# Patient Record
Sex: FEMALE | Race: WHITE | NOT HISPANIC OR LATINO | Employment: OTHER | URBAN - METROPOLITAN AREA
[De-identification: names, ages, dates, MRNs, and addresses within clinical notes are randomized per-mention and may not be internally consistent; named-entity substitution may affect disease eponyms.]

---

## 2016-10-04 LAB
EXTERNAL ABO GROUPING: NORMAL
EXTERNAL RH FACTOR: POSITIVE

## 2016-10-14 LAB
EXTERNAL ANTIBODY SCREEN: NEGATIVE
EXTERNAL CHLAMYDIA SCREEN: NEGATIVE
EXTERNAL GONORRHEA SCREEN: NEGATIVE
EXTERNAL GTT 1 HOUR: 127
EXTERNAL GTT 3 HOURS: 73
EXTERNAL HEPATITIS B SURFACE ANTIGEN: NEGATIVE
EXTERNAL RUBELLA QUALITATIVE: NORMAL
EXTERNAL SYPHILIS RPR SCREEN: NORMAL
EXTERNAL URINE DRUG SCREEN: NEGATIVE
HIV1 AB SPEC QL IA.RAPID: NEGATIVE

## 2016-11-01 LAB — EXTERNAL GENETIC TESTING, MATERNAL BLOOD: NORMAL

## 2017-03-28 ENCOUNTER — HOSPITAL ENCOUNTER (OUTPATIENT)
Facility: HOSPITAL | Age: 27
Discharge: HOME | End: 2017-03-28
Attending: OBSTETRICS & GYNECOLOGY | Admitting: OBSTETRICS & GYNECOLOGY

## 2017-03-28 VITALS
BODY MASS INDEX: 29.98 KG/M2 | HEART RATE: 93 BPM | WEIGHT: 191 LBS | TEMPERATURE: 98.3 F | HEIGHT: 67 IN | RESPIRATION RATE: 18 BRPM | SYSTOLIC BLOOD PRESSURE: 133 MMHG | DIASTOLIC BLOOD PRESSURE: 74 MMHG

## 2017-03-28 PROCEDURE — G0463 HOSPITAL OUTPT CLINIC VISIT: HCPCS

## 2017-03-28 PROCEDURE — 59025 FETAL NON-STRESS TEST: CPT

## 2017-03-28 RX ORDER — DOXYLAMINE SUCCINATE AND PYRIDOXINE HYDROCHLORIDE, DELAYED RELEASE TABLETS 10 MG/10 MG 10; 10 MG/1; MG/1
1 TABLET, DELAYED RELEASE ORAL 4 TIMES DAILY
COMMUNITY
End: 2017-05-11 | Stop reason: HOSPADM

## 2017-03-28 RX ORDER — PRENATAL WITH FERROUS FUM AND FOLIC ACID 3080; 920; 120; 400; 22; 1.84; 3; 20; 10; 1; 12; 200; 27; 25; 2 [IU]/1; [IU]/1; MG/1; [IU]/1; MG/1; MG/1; MG/1; MG/1; MG/1; MG/1; UG/1; MG/1; MG/1; MG/1; MG/1
TABLET ORAL DAILY
COMMUNITY
End: 2022-08-16

## 2017-03-28 NOTE — NURSING NOTE
Pt given discharge instructions for home,  Pt instructed to return to l & d for srom, decreased fm, or vaginal bleeding , pt with vu .

## 2017-03-28 NOTE — H&P
[facility]  Obstetric History and Physical    Chief Complaint   Patient presents with   • Decreased Fetal Movement     pt reports decreased fm over the last since yesterday morning        Subjective     Patient is a 27 y.o. female  currently at 33w2d, who presents with decreased fetal movement.    Her prenatal care is benign.  Her previous obstetric/gynecological history is noted for is non-contributory.    The following portions of the patients history were reviewed and updated as appropriate: current medications, allergies, past medical history, past surgical history, past family history, past social history and problem list .       Prenatal Information:  Prenatal Results         1st Trimester Ref. Range Date Time   CBC with auto diff       Rubella IgG       Hepatitis B SAg       RPR       ABO ^ O   10/04/16    Rh ^ Positive   10/04/16    Anibody Screen       HIV       Varicella IgG       Urinalysis with microscopy       Urine Culture       GC/Chlamydia/TV       ThinPrep/Pap       2nd and 3rd Trimester Ref. Range Date Time   Hemoglobin / Hematocrit       Hemoglobin       Group B Strep Culture       Glucose Challenge Test 1 Hr       Glucose Fasting       Glucose 1 Hr       Glucose 2 Hr       Glucose 3 Hr       Pre-eclampsia Panel       Risk Screening Ref. Range Date Time   Fetal Fibronectin       Amnisure       Hepatitis C Antibody       Hemoglobin electrophoresis       Cystic Fibrosis       Hemoglobin A1C       MSAFP - 4 ^ harmony -  negative   16 0800   NIPT       AFP       Parvovirus IgG       Parvovirus IgM       POCT - glucose       Florian-Sac       24 Hour urine - Total protein       24 Hour urine - Creatinine clearance       Urinalysis with microscopy       Urine Culture       Drug Screening Ref. Range Date Time   Amphetamine Screen       Barbiturate Screen       Benzodiazepine Screen       Methadone Screen       Phencyclidine Screen       Opiates Screen       THC Screen       Cocaine Screen        Propoxyphene Screen       Buprenorphine Screen       Methamphetamine Screen       Oxycodone Screen       Tryicyclic Antidepressants Screen              Legend: ^: Historical            View all results for this pregnancy        External Prenatal Results         Pregnancy Outside Results - these were transcribed from office records.  See scanned records for details. Date Time   Hgb      Hct      ABO ^ O  10/04/16    Rh ^ Positive  10/04/16    Antibody Screen      Glucose Fasting GTT      Glucose Tolerance Test 1 hour      Glucose Tolerance Test 3 hour      Gonorrhea (discrete)      Chlamydia (discrete)      RPR      VDRL      Syphillis Antibody      Rubella      HBsAg      Herpes Simplex Virus PCR      Herpes Simplex VIrus Culture      HIV      Hep C RNA Quant PCR      Hep C Antibody      Urine Drug Screen      AFP      Group B Strep      GBS Susceptibility to Clindamycin      GBS Susceptibility to Eythromycin      Fetal Fibronectin      Genetic Testing, Maternal Blood ^ harmony -  negative  16 0800          Legend: ^: Historical           Past OB History:     Obstetric History       T0      TAB0   SAB0   E0   M0   L0       # Outcome Date GA Lbr Morales/2nd Weight Sex Delivery Anes PTL Lv   1 Current                   Past Medical History: History reviewed. No pertinent past medical history.   Past Surgical History Past Surgical History:   Procedure Laterality Date   • RHINOPLASTY     • TONSILLECTOMY        Family History: History reviewed. No pertinent family history.   Social History:  reports that she has never smoked. She has never used smokeless tobacco.   reports that she does not drink alcohol.   reports that she does not use illicit drugs.        Review of Systems      Objective     Vital Signs Range for the last 24 hours  Temperature: Temp:  [98.3 °F (36.8 °C)] 98.3 °F (36.8 °C)   Temp Source: Temp src: Oral   BP: BP: (133)/(74) 133/74   Pulse: Heart Rate:  [93] 93   Respirations: Resp:   [18] 18   SPO2:     O2 Amount (l/min):     O2 Devices     Weight: Weight:  [191 lb (86.6 kg)] 191 lb (86.6 kg)     Physical Examination: General appearance - alert, well appearing, and in no distress  Neck - supple, no significant adenopathy  Abdomen - soft, nontender, nondistended, no masses or organomegaly  Pelvic - normal external genitalia, vulva, vagina, cervix, uterus and adnexa  Musculoskeletal - no joint tenderness, deformity or swelling  Extremities - peripheral pulses normal, no pedal edema, no clubbing or cyanosis    Presentation:    Cervix: Exam by: Method: sterile exam per physician   Dilation: Dilation: 0   Effacement: 0   Station: -4, posterior     Fetal Heart Rate Assessment   Method: Fetal HR Assessment Method: external   Beats/min: Fetal HR (Beats/Min): 145   Baseline: Fetal HR Baseline: normal range (110-160 bpm)   Varibility: Fetal HR Variability: moderate (amplitude range 6 to 25 bpm)   Accels: Fetal HR Accelerations: greater than/equal to 15 bpm, lasting at least 15 seconds   Decels: Fetal HR Decelerations: absent   Tracing Category:       Uterine Assessment   Method: Method: TOCO (external toco transducer)   Frequency (min): Contraction Frequency (min): x2   Ctx Count in 10 min:     Duration: Contraction Duration (sec): 60   Intensity:     Intensity by IUPC:     Resting Tone:     Resting Tone by IUPC:     Nato Units:       Assessment/Plan       Assessment & Plan    Assessment:  1.  Intrauterine pregnancy at 33w2d weeks gestation with  Reassuring fetal status, very reactive NST/monitoring x 1hr.    2.  decreased fetal movement - BPP 8/8 in office today  3.  Obstetrical history significant for is non-contributory.  4.  GBS status: No results found for: GBSANTIGEN    Plan:  1. Discharge to home.  Has f/u in office in 1wk.  2. Plan of care has been reviewed with patient and she agrees.  3.  Risks, benefits of treatment plan have been discussed.  4.  All questions have been  answered.        Deisy Guzman MD  3/28/2017  6:39 PM

## 2017-04-14 ENCOUNTER — HOSPITAL ENCOUNTER (OUTPATIENT)
Facility: HOSPITAL | Age: 27
End: 2017-04-14
Attending: OBSTETRICS & GYNECOLOGY | Admitting: OBSTETRICS & GYNECOLOGY

## 2017-04-18 ENCOUNTER — LAB REQUISITION (OUTPATIENT)
Dept: LAB | Facility: HOSPITAL | Age: 27
End: 2017-04-18

## 2017-04-18 DIAGNOSIS — Z34.03 ENCOUNTER FOR SUPERVISION OF NORMAL FIRST PREGNANCY IN THIRD TRIMESTER: ICD-10-CM

## 2017-04-18 LAB — EXTERNAL GROUP B STREP ANTIGEN: NEGATIVE

## 2017-04-18 PROCEDURE — 87081 CULTURE SCREEN ONLY: CPT | Performed by: OBSTETRICS & GYNECOLOGY

## 2017-04-21 LAB — BACTERIA SPEC AEROBE CULT: NORMAL

## 2017-05-08 ENCOUNTER — HOSPITAL ENCOUNTER (INPATIENT)
Dept: LABOR AND DELIVERY | Facility: HOSPITAL | Age: 27
LOS: 2 days | Discharge: HOME | End: 2017-05-11
Attending: OBSTETRICS & GYNECOLOGY | Admitting: OBSTETRICS & GYNECOLOGY

## 2017-05-08 LAB
DEPRECATED RDW RBC AUTO: 51.1 FL (ref 37–54)
ERYTHROCYTE [DISTWIDTH] IN BLOOD BY AUTOMATED COUNT: 15 % (ref 11.3–14.5)
HCT VFR BLD AUTO: 35.5 % (ref 34.5–44)
HGB BLD-MCNC: 11.4 G/DL (ref 11.5–15.5)
MCH RBC QN AUTO: 30.2 PG (ref 27–31)
MCHC RBC AUTO-ENTMCNC: 32.1 G/DL (ref 32–36)
MCV RBC AUTO: 94.2 FL (ref 80–99)
PLATELET # BLD AUTO: 226 10*3/MM3 (ref 150–450)
PMV BLD AUTO: 10.9 FL (ref 6–12)
RBC # BLD AUTO: 3.77 10*6/MM3 (ref 3.89–5.14)
WBC NRBC COR # BLD: 10.44 10*3/MM3 (ref 3.5–10.8)

## 2017-05-08 PROCEDURE — 86900 BLOOD TYPING SEROLOGIC ABO: CPT | Performed by: OBSTETRICS & GYNECOLOGY

## 2017-05-08 PROCEDURE — 86901 BLOOD TYPING SEROLOGIC RH(D): CPT | Performed by: OBSTETRICS & GYNECOLOGY

## 2017-05-08 PROCEDURE — 86850 RBC ANTIBODY SCREEN: CPT | Performed by: OBSTETRICS & GYNECOLOGY

## 2017-05-08 PROCEDURE — 85027 COMPLETE CBC AUTOMATED: CPT | Performed by: OBSTETRICS & GYNECOLOGY

## 2017-05-08 RX ORDER — SODIUM CHLORIDE, SODIUM LACTATE, POTASSIUM CHLORIDE, CALCIUM CHLORIDE 600; 310; 30; 20 MG/100ML; MG/100ML; MG/100ML; MG/100ML
125 INJECTION, SOLUTION INTRAVENOUS CONTINUOUS
Status: DISCONTINUED | OUTPATIENT
Start: 2017-05-08 | End: 2017-05-09

## 2017-05-08 RX ADMIN — SODIUM CHLORIDE, POTASSIUM CHLORIDE, SODIUM LACTATE AND CALCIUM CHLORIDE 125 ML/HR: 600; 310; 30; 20 INJECTION, SOLUTION INTRAVENOUS at 23:25

## 2017-05-09 ENCOUNTER — ANESTHESIA EVENT (OUTPATIENT)
Dept: LABOR AND DELIVERY | Facility: HOSPITAL | Age: 27
End: 2017-05-09

## 2017-05-09 ENCOUNTER — ANESTHESIA (OUTPATIENT)
Dept: LABOR AND DELIVERY | Facility: HOSPITAL | Age: 27
End: 2017-05-09

## 2017-05-09 PROBLEM — Z34.90 PREGNANCY: Status: RESOLVED | Noted: 2017-05-09 | Resolved: 2017-05-09

## 2017-05-09 PROBLEM — Z34.90 PREGNANCY: Status: ACTIVE | Noted: 2017-05-09

## 2017-05-09 LAB
ABO GROUP BLD: NORMAL
BLD GP AB SCN SERPL QL: NEGATIVE
RH BLD: POSITIVE

## 2017-05-09 PROCEDURE — C1755 CATHETER, INTRASPINAL: HCPCS

## 2017-05-09 PROCEDURE — C1755 CATHETER, INTRASPINAL: HCPCS | Performed by: ANESTHESIOLOGY

## 2017-05-09 PROCEDURE — 25010000002 BUTORPHANOL PER 1 MG: Performed by: OBSTETRICS & GYNECOLOGY

## 2017-05-09 PROCEDURE — 25010000002 ONDANSETRON PER 1 MG: Performed by: OBSTETRICS & GYNECOLOGY

## 2017-05-09 PROCEDURE — 25010000002 ROPIVACAINE PER 1 MG: Performed by: NURSE ANESTHETIST, CERTIFIED REGISTERED

## 2017-05-09 PROCEDURE — 10907ZC DRAINAGE OF AMNIOTIC FLUID, THERAPEUTIC FROM PRODUCTS OF CONCEPTION, VIA NATURAL OR ARTIFICIAL OPENING: ICD-10-PCS | Performed by: OBSTETRICS & GYNECOLOGY

## 2017-05-09 PROCEDURE — 25010000002 FENTANYL CITRATE (PF) 100 MCG/2ML SOLUTION: Performed by: NURSE ANESTHETIST, CERTIFIED REGISTERED

## 2017-05-09 PROCEDURE — 59025 FETAL NON-STRESS TEST: CPT

## 2017-05-09 RX ORDER — BISACODYL 10 MG
10 SUPPOSITORY, RECTAL RECTAL DAILY PRN
Status: DISCONTINUED | OUTPATIENT
Start: 2017-05-10 | End: 2017-05-11 | Stop reason: HOSPADM

## 2017-05-09 RX ORDER — OXYTOCIN/RINGER'S LACTATE 30/500 ML
2-24 PLASTIC BAG, INJECTION (ML) INTRAVENOUS
Status: DISCONTINUED | OUTPATIENT
Start: 2017-05-09 | End: 2017-05-09 | Stop reason: HOSPADM

## 2017-05-09 RX ORDER — FENTANYL CITRATE 50 UG/ML
INJECTION, SOLUTION INTRAMUSCULAR; INTRAVENOUS AS NEEDED
Status: DISCONTINUED | OUTPATIENT
Start: 2017-05-09 | End: 2017-05-09 | Stop reason: SURG

## 2017-05-09 RX ORDER — ACETAMINOPHEN 325 MG/1
650 TABLET ORAL EVERY 4 HOURS PRN
Status: DISCONTINUED | OUTPATIENT
Start: 2017-05-09 | End: 2017-05-09 | Stop reason: HOSPADM

## 2017-05-09 RX ORDER — LIDOCAINE HYDROCHLORIDE 10 MG/ML
5 INJECTION, SOLUTION INFILTRATION; PERINEURAL AS NEEDED
Status: DISCONTINUED | OUTPATIENT
Start: 2017-05-09 | End: 2017-05-09 | Stop reason: HOSPADM

## 2017-05-09 RX ORDER — METOCLOPRAMIDE HYDROCHLORIDE 5 MG/ML
10 INJECTION INTRAMUSCULAR; INTRAVENOUS ONCE AS NEEDED
Status: DISCONTINUED | OUTPATIENT
Start: 2017-05-09 | End: 2017-05-09 | Stop reason: HOSPADM

## 2017-05-09 RX ORDER — ONDANSETRON 2 MG/ML
4 INJECTION INTRAMUSCULAR; INTRAVENOUS EVERY 6 HOURS PRN
Status: DISCONTINUED | OUTPATIENT
Start: 2017-05-09 | End: 2017-05-10

## 2017-05-09 RX ORDER — ONDANSETRON 2 MG/ML
4 INJECTION INTRAMUSCULAR; INTRAVENOUS ONCE AS NEEDED
Status: DISCONTINUED | OUTPATIENT
Start: 2017-05-09 | End: 2017-05-09 | Stop reason: HOSPADM

## 2017-05-09 RX ORDER — ZOLPIDEM TARTRATE 5 MG/1
5 TABLET ORAL NIGHTLY PRN
Status: DISCONTINUED | OUTPATIENT
Start: 2017-05-09 | End: 2017-05-11 | Stop reason: HOSPADM

## 2017-05-09 RX ORDER — DIPHENHYDRAMINE HYDROCHLORIDE 50 MG/ML
25 INJECTION INTRAMUSCULAR; INTRAVENOUS NIGHTLY PRN
Status: DISCONTINUED | OUTPATIENT
Start: 2017-05-09 | End: 2017-05-09 | Stop reason: HOSPADM

## 2017-05-09 RX ORDER — ZOLPIDEM TARTRATE 5 MG/1
5 TABLET ORAL NIGHTLY PRN
Status: DISCONTINUED | OUTPATIENT
Start: 2017-05-09 | End: 2017-05-09 | Stop reason: HOSPADM

## 2017-05-09 RX ORDER — PROMETHAZINE HYDROCHLORIDE 12.5 MG/1
12.5 SUPPOSITORY RECTAL EVERY 6 HOURS PRN
Status: DISCONTINUED | OUTPATIENT
Start: 2017-05-09 | End: 2017-05-09 | Stop reason: HOSPADM

## 2017-05-09 RX ORDER — PROMETHAZINE HYDROCHLORIDE 25 MG/1
25 TABLET ORAL EVERY 6 HOURS PRN
Status: DISCONTINUED | OUTPATIENT
Start: 2017-05-09 | End: 2017-05-11 | Stop reason: HOSPADM

## 2017-05-09 RX ORDER — DIPHENHYDRAMINE HCL 25 MG
25 CAPSULE ORAL NIGHTLY PRN
Status: DISCONTINUED | OUTPATIENT
Start: 2017-05-09 | End: 2017-05-09 | Stop reason: HOSPADM

## 2017-05-09 RX ORDER — PROMETHAZINE HYDROCHLORIDE 25 MG/ML
12.5 INJECTION, SOLUTION INTRAMUSCULAR; INTRAVENOUS EVERY 6 HOURS PRN
Status: DISCONTINUED | OUTPATIENT
Start: 2017-05-09 | End: 2017-05-09 | Stop reason: HOSPADM

## 2017-05-09 RX ORDER — SODIUM CHLORIDE, SODIUM LACTATE, POTASSIUM CHLORIDE, CALCIUM CHLORIDE 600; 310; 30; 20 MG/100ML; MG/100ML; MG/100ML; MG/100ML
125 INJECTION, SOLUTION INTRAVENOUS CONTINUOUS
Status: DISCONTINUED | OUTPATIENT
Start: 2017-05-09 | End: 2017-05-10

## 2017-05-09 RX ORDER — PROMETHAZINE HYDROCHLORIDE 12.5 MG/1
12.5 TABLET ORAL EVERY 6 HOURS PRN
Status: DISCONTINUED | OUTPATIENT
Start: 2017-05-09 | End: 2017-05-09 | Stop reason: HOSPADM

## 2017-05-09 RX ORDER — DOCUSATE SODIUM 100 MG/1
100 CAPSULE, LIQUID FILLED ORAL 2 TIMES DAILY
Status: DISCONTINUED | OUTPATIENT
Start: 2017-05-09 | End: 2017-05-11 | Stop reason: HOSPADM

## 2017-05-09 RX ORDER — OXYCODONE HYDROCHLORIDE AND ACETAMINOPHEN 5; 325 MG/1; MG/1
2 TABLET ORAL EVERY 4 HOURS PRN
Status: DISCONTINUED | OUTPATIENT
Start: 2017-05-09 | End: 2017-05-09 | Stop reason: HOSPADM

## 2017-05-09 RX ORDER — IBUPROFEN 600 MG/1
600 TABLET ORAL EVERY 6 HOURS PRN
Status: DISCONTINUED | OUTPATIENT
Start: 2017-05-09 | End: 2017-05-11 | Stop reason: HOSPADM

## 2017-05-09 RX ORDER — DIPHENHYDRAMINE HYDROCHLORIDE 50 MG/ML
12.5 INJECTION INTRAMUSCULAR; INTRAVENOUS EVERY 8 HOURS PRN
Status: DISCONTINUED | OUTPATIENT
Start: 2017-05-09 | End: 2017-05-09 | Stop reason: HOSPADM

## 2017-05-09 RX ORDER — EPHEDRINE SULFATE/0.9% NACL/PF 50 MG/10ML
10 SYRINGE (ML) INTRAVENOUS
Status: DISCONTINUED | OUTPATIENT
Start: 2017-05-09 | End: 2017-05-09 | Stop reason: HOSPADM

## 2017-05-09 RX ORDER — SODIUM CHLORIDE 0.9 % (FLUSH) 0.9 %
1-10 SYRINGE (ML) INJECTION AS NEEDED
Status: DISCONTINUED | OUTPATIENT
Start: 2017-05-09 | End: 2017-05-09 | Stop reason: HOSPADM

## 2017-05-09 RX ORDER — FAMOTIDINE 10 MG/ML
20 INJECTION, SOLUTION INTRAVENOUS ONCE AS NEEDED
Status: DISCONTINUED | OUTPATIENT
Start: 2017-05-09 | End: 2017-05-09 | Stop reason: HOSPADM

## 2017-05-09 RX ORDER — HYDROCODONE BITARTRATE AND ACETAMINOPHEN 5; 325 MG/1; MG/1
1 TABLET ORAL EVERY 4 HOURS PRN
Status: DISCONTINUED | OUTPATIENT
Start: 2017-05-09 | End: 2017-05-11 | Stop reason: HOSPADM

## 2017-05-09 RX ORDER — OXYTOCIN/RINGER'S LACTATE 20/1000 ML
125 PLASTIC BAG, INJECTION (ML) INTRAVENOUS AS NEEDED
Status: DISCONTINUED | OUTPATIENT
Start: 2017-05-09 | End: 2017-05-09 | Stop reason: HOSPADM

## 2017-05-09 RX ORDER — LANOLIN 100 %
OINTMENT (GRAM) TOPICAL AS NEEDED
Status: DISCONTINUED | OUTPATIENT
Start: 2017-05-09 | End: 2017-05-11 | Stop reason: HOSPADM

## 2017-05-09 RX ORDER — OXYTOCIN/RINGER'S LACTATE 20/1000 ML
999 PLASTIC BAG, INJECTION (ML) INTRAVENOUS ONCE
Status: COMPLETED | OUTPATIENT
Start: 2017-05-09 | End: 2017-05-09

## 2017-05-09 RX ORDER — LIDOCAINE HYDROCHLORIDE AND EPINEPHRINE 15; 5 MG/ML; UG/ML
INJECTION, SOLUTION EPIDURAL AS NEEDED
Status: DISCONTINUED | OUTPATIENT
Start: 2017-05-09 | End: 2017-05-09 | Stop reason: SURG

## 2017-05-09 RX ADMIN — Medication 10 MG: at 13:26

## 2017-05-09 RX ADMIN — Medication 19.98 UNITS/HR: at 16:45

## 2017-05-09 RX ADMIN — Medication 2 MILLI-UNITS/MIN: at 05:00

## 2017-05-09 RX ADMIN — ROPIVACAINE HYDROCHLORIDE 16 ML/HR: 5 INJECTION, SOLUTION EPIDURAL; INFILTRATION; PERINEURAL at 11:33

## 2017-05-09 RX ADMIN — SODIUM CHLORIDE, POTASSIUM CHLORIDE, SODIUM LACTATE AND CALCIUM CHLORIDE 125 ML/HR: 600; 310; 30; 20 INJECTION, SOLUTION INTRAVENOUS at 10:55

## 2017-05-09 RX ADMIN — ONDANSETRON 4 MG: 2 INJECTION INTRAMUSCULAR; INTRAVENOUS at 07:43

## 2017-05-09 RX ADMIN — DOCUSATE SODIUM 100 MG: 100 CAPSULE, LIQUID FILLED ORAL at 20:56

## 2017-05-09 RX ADMIN — LIDOCAINE HYDROCHLORIDE AND EPINEPHRINE 3 ML: 15; 5 INJECTION, SOLUTION EPIDURAL at 11:24

## 2017-05-09 RX ADMIN — FENTANYL CITRATE 100 MCG: 50 INJECTION, SOLUTION INTRAMUSCULAR; INTRAVENOUS at 11:28

## 2017-05-09 RX ADMIN — ROPIVACAINE HYDROCHLORIDE 10 ML: 5 INJECTION, SOLUTION EPIDURAL; INFILTRATION; PERINEURAL at 11:31

## 2017-05-09 RX ADMIN — LIDOCAINE HYDROCHLORIDE AND EPINEPHRINE 2 ML: 15; 5 INJECTION, SOLUTION EPIDURAL at 11:27

## 2017-05-09 RX ADMIN — SODIUM CHLORIDE, POTASSIUM CHLORIDE, SODIUM LACTATE AND CALCIUM CHLORIDE 999 ML/HR: 600; 310; 30; 20 INJECTION, SOLUTION INTRAVENOUS at 10:37

## 2017-05-09 RX ADMIN — IBUPROFEN 600 MG: 600 TABLET, FILM COATED ORAL at 20:56

## 2017-05-09 RX ADMIN — SODIUM CHLORIDE, POTASSIUM CHLORIDE, SODIUM LACTATE AND CALCIUM CHLORIDE 1000 ML: 600; 310; 30; 20 INJECTION, SOLUTION INTRAVENOUS at 10:12

## 2017-05-09 RX ADMIN — OXYCODONE AND ACETAMINOPHEN 2 TABLET: 5; 325 TABLET ORAL at 17:43

## 2017-05-09 RX ADMIN — BUTORPHANOL TARTRATE 2 MG: 2 INJECTION, SOLUTION INTRAMUSCULAR; INTRAVENOUS at 01:00

## 2017-05-09 RX ADMIN — Medication 2.5 UNITS/HR: at 17:25

## 2017-05-09 RX ADMIN — BUTORPHANOL TARTRATE 2 MG: 2 INJECTION, SOLUTION INTRAMUSCULAR; INTRAVENOUS at 09:05

## 2017-05-10 LAB
BASOPHILS # BLD AUTO: 0.02 10*3/MM3 (ref 0–0.2)
BASOPHILS NFR BLD AUTO: 0.1 % (ref 0–1)
DEPRECATED RDW RBC AUTO: 50.4 FL (ref 37–54)
EOSINOPHIL # BLD AUTO: 0.06 10*3/MM3 (ref 0.1–0.3)
EOSINOPHIL NFR BLD AUTO: 0.4 % (ref 0–3)
ERYTHROCYTE [DISTWIDTH] IN BLOOD BY AUTOMATED COUNT: 14.7 % (ref 11.3–14.5)
HCT VFR BLD AUTO: 32.6 % (ref 34.5–44)
HGB BLD-MCNC: 10.3 G/DL (ref 11.5–15.5)
IMM GRANULOCYTES # BLD: 0.07 10*3/MM3 (ref 0–0.03)
IMM GRANULOCYTES NFR BLD: 0.4 % (ref 0–0.6)
LYMPHOCYTES # BLD AUTO: 2.13 10*3/MM3 (ref 0.6–4.8)
LYMPHOCYTES NFR BLD AUTO: 13.6 % (ref 24–44)
MCH RBC QN AUTO: 29.8 PG (ref 27–31)
MCHC RBC AUTO-ENTMCNC: 31.6 G/DL (ref 32–36)
MCV RBC AUTO: 94.2 FL (ref 80–99)
MONOCYTES # BLD AUTO: 1.09 10*3/MM3 (ref 0–1)
MONOCYTES NFR BLD AUTO: 6.9 % (ref 0–12)
NEUTROPHILS # BLD AUTO: 12.32 10*3/MM3 (ref 1.5–8.3)
NEUTROPHILS NFR BLD AUTO: 78.6 % (ref 41–71)
PLATELET # BLD AUTO: 212 10*3/MM3 (ref 150–450)
PMV BLD AUTO: 11.1 FL (ref 6–12)
RBC # BLD AUTO: 3.46 10*6/MM3 (ref 3.89–5.14)
WBC NRBC COR # BLD: 15.69 10*3/MM3 (ref 3.5–10.8)

## 2017-05-10 PROCEDURE — 63710000001 PROMETHAZINE PER 25 MG: Performed by: OBSTETRICS & GYNECOLOGY

## 2017-05-10 PROCEDURE — 85025 COMPLETE CBC W/AUTO DIFF WBC: CPT | Performed by: OBSTETRICS & GYNECOLOGY

## 2017-05-10 RX ORDER — PROMETHAZINE HYDROCHLORIDE 12.5 MG/1
12.5 SUPPOSITORY RECTAL EVERY 6 HOURS PRN
Status: DISCONTINUED | OUTPATIENT
Start: 2017-05-10 | End: 2017-05-11 | Stop reason: HOSPADM

## 2017-05-10 RX ORDER — DIPHENHYDRAMINE HYDROCHLORIDE 50 MG/ML
25 INJECTION INTRAMUSCULAR; INTRAVENOUS NIGHTLY PRN
Status: DISCONTINUED | OUTPATIENT
Start: 2017-05-10 | End: 2017-05-11 | Stop reason: HOSPADM

## 2017-05-10 RX ORDER — MORPHINE SULFATE 2 MG/ML
2 INJECTION, SOLUTION INTRAMUSCULAR; INTRAVENOUS
Status: DISCONTINUED | OUTPATIENT
Start: 2017-05-10 | End: 2017-05-11 | Stop reason: HOSPADM

## 2017-05-10 RX ORDER — OXYCODONE HYDROCHLORIDE AND ACETAMINOPHEN 5; 325 MG/1; MG/1
1 TABLET ORAL EVERY 4 HOURS PRN
Status: DISCONTINUED | OUTPATIENT
Start: 2017-05-10 | End: 2017-05-11 | Stop reason: HOSPADM

## 2017-05-10 RX ORDER — IBUPROFEN 600 MG/1
600 TABLET ORAL EVERY 6 HOURS PRN
Status: DISCONTINUED | OUTPATIENT
Start: 2017-05-10 | End: 2017-05-10 | Stop reason: SDUPTHER

## 2017-05-10 RX ORDER — PROMETHAZINE HYDROCHLORIDE 12.5 MG/1
12.5 TABLET ORAL EVERY 6 HOURS PRN
Status: DISCONTINUED | OUTPATIENT
Start: 2017-05-10 | End: 2017-05-11 | Stop reason: HOSPADM

## 2017-05-10 RX ORDER — PROMETHAZINE HYDROCHLORIDE 25 MG/ML
12.5 INJECTION, SOLUTION INTRAMUSCULAR; INTRAVENOUS EVERY 6 HOURS PRN
Status: DISCONTINUED | OUTPATIENT
Start: 2017-05-10 | End: 2017-05-11 | Stop reason: HOSPADM

## 2017-05-10 RX ORDER — ACETAMINOPHEN 325 MG/1
650 TABLET ORAL EVERY 4 HOURS PRN
Status: DISCONTINUED | OUTPATIENT
Start: 2017-05-10 | End: 2017-05-11 | Stop reason: HOSPADM

## 2017-05-10 RX ORDER — ZOLPIDEM TARTRATE 5 MG/1
5 TABLET ORAL NIGHTLY PRN
Status: DISCONTINUED | OUTPATIENT
Start: 2017-05-10 | End: 2017-05-10 | Stop reason: SDUPTHER

## 2017-05-10 RX ORDER — PROMETHAZINE HYDROCHLORIDE 25 MG/ML
12.5 INJECTION, SOLUTION INTRAMUSCULAR; INTRAVENOUS EVERY 6 HOURS PRN
Status: DISCONTINUED | OUTPATIENT
Start: 2017-05-10 | End: 2017-05-10 | Stop reason: SDUPTHER

## 2017-05-10 RX ORDER — DIPHENHYDRAMINE HCL 25 MG
25 CAPSULE ORAL NIGHTLY PRN
Status: DISCONTINUED | OUTPATIENT
Start: 2017-05-10 | End: 2017-05-11 | Stop reason: HOSPADM

## 2017-05-10 RX ORDER — MORPHINE SULFATE 2 MG/ML
2 INJECTION, SOLUTION INTRAMUSCULAR; INTRAVENOUS
Status: DISCONTINUED | OUTPATIENT
Start: 2017-05-10 | End: 2017-05-10 | Stop reason: SDUPTHER

## 2017-05-10 RX ADMIN — HYDROCODONE BITARTRATE AND ACETAMINOPHEN 1 TABLET: 5; 325 TABLET ORAL at 22:47

## 2017-05-10 RX ADMIN — IBUPROFEN 600 MG: 600 TABLET, FILM COATED ORAL at 03:15

## 2017-05-10 RX ADMIN — HYDROCODONE BITARTRATE AND ACETAMINOPHEN 1 TABLET: 5; 325 TABLET ORAL at 03:15

## 2017-05-10 RX ADMIN — PROMETHAZINE HYDROCHLORIDE 25 MG: 25 TABLET ORAL at 11:27

## 2017-05-10 RX ADMIN — DOCUSATE SODIUM 100 MG: 100 CAPSULE, LIQUID FILLED ORAL at 18:47

## 2017-05-10 RX ADMIN — DOCUSATE SODIUM 100 MG: 100 CAPSULE, LIQUID FILLED ORAL at 08:17

## 2017-05-10 RX ADMIN — IBUPROFEN 600 MG: 600 TABLET, FILM COATED ORAL at 14:53

## 2017-05-10 RX ADMIN — HYDROCODONE BITARTRATE AND ACETAMINOPHEN 1 TABLET: 5; 325 TABLET ORAL at 14:53

## 2017-05-10 RX ADMIN — IBUPROFEN 600 MG: 600 TABLET, FILM COATED ORAL at 21:03

## 2017-05-10 RX ADMIN — HYDROCODONE BITARTRATE AND ACETAMINOPHEN 1 TABLET: 5; 325 TABLET ORAL at 18:47

## 2017-05-11 VITALS
HEIGHT: 67 IN | RESPIRATION RATE: 16 BRPM | TEMPERATURE: 98.2 F | HEART RATE: 79 BPM | WEIGHT: 194 LBS | DIASTOLIC BLOOD PRESSURE: 69 MMHG | SYSTOLIC BLOOD PRESSURE: 132 MMHG | BODY MASS INDEX: 30.45 KG/M2

## 2017-05-11 RX ORDER — IBUPROFEN 600 MG/1
600 TABLET ORAL EVERY 6 HOURS PRN
Qty: 30 TABLET | Refills: 0 | Status: SHIPPED | OUTPATIENT
Start: 2017-05-11 | End: 2022-08-16

## 2017-05-11 RX ORDER — HYDROCODONE BITARTRATE AND ACETAMINOPHEN 5; 325 MG/1; MG/1
1 TABLET ORAL EVERY 4 HOURS PRN
Qty: 12 TABLET | Refills: 0 | Status: SHIPPED | OUTPATIENT
Start: 2017-05-11 | End: 2017-05-14

## 2017-05-11 RX ORDER — CYCLOBENZAPRINE HCL 10 MG
5 TABLET ORAL EVERY 8 HOURS PRN
Qty: 10 TABLET | Refills: 1 | Status: SHIPPED | OUTPATIENT
Start: 2017-05-11 | End: 2017-05-14

## 2017-05-11 RX ADMIN — HYDROCODONE BITARTRATE AND ACETAMINOPHEN 1 TABLET: 5; 325 TABLET ORAL at 08:38

## 2017-05-11 RX ADMIN — HYDROCODONE BITARTRATE AND ACETAMINOPHEN 1 TABLET: 5; 325 TABLET ORAL at 03:07

## 2017-05-11 RX ADMIN — DOCUSATE SODIUM 100 MG: 100 CAPSULE, LIQUID FILLED ORAL at 08:38

## 2017-05-11 RX ADMIN — IBUPROFEN 600 MG: 600 TABLET, FILM COATED ORAL at 03:07

## 2017-05-11 RX ADMIN — IBUPROFEN 600 MG: 600 TABLET, FILM COATED ORAL at 08:38

## 2017-05-15 ENCOUNTER — HOSPITAL ENCOUNTER (OUTPATIENT)
Facility: HOSPITAL | Age: 27
Discharge: HOME | End: 2017-05-15
Attending: OBSTETRICS & GYNECOLOGY | Admitting: OBSTETRICS & GYNECOLOGY

## 2017-05-15 ENCOUNTER — ANESTHESIA (OUTPATIENT)
Dept: LABOR AND DELIVERY | Facility: HOSPITAL | Age: 27
End: 2017-05-15

## 2017-05-15 ENCOUNTER — ANESTHESIA EVENT (OUTPATIENT)
Dept: LABOR AND DELIVERY | Facility: HOSPITAL | Age: 27
End: 2017-05-15

## 2017-05-15 VITALS — TEMPERATURE: 98.1 F | DIASTOLIC BLOOD PRESSURE: 73 MMHG | RESPIRATION RATE: 18 BRPM | SYSTOLIC BLOOD PRESSURE: 121 MMHG

## 2017-05-15 PROCEDURE — G0463 HOSPITAL OUTPT CLINIC VISIT: HCPCS

## 2018-06-15 ENCOUNTER — TRANSCRIBE ORDERS (OUTPATIENT)
Dept: ADMINISTRATIVE | Facility: HOSPITAL | Age: 28
End: 2018-06-15

## 2018-06-15 DIAGNOSIS — R10.13 ABDOMINAL PAIN, ACUTE, EPIGASTRIC: Primary | ICD-10-CM

## 2021-05-17 ENCOUNTER — HOSPITAL ENCOUNTER (OUTPATIENT)
Dept: GENERAL RADIOLOGY | Facility: HOSPITAL | Age: 31
Discharge: HOME | End: 2021-05-17
Admitting: INTERNAL MEDICINE

## 2021-05-17 ENCOUNTER — TRANSCRIBE ORDERS (OUTPATIENT)
Dept: ADMINISTRATIVE | Facility: HOSPITAL | Age: 31
End: 2021-05-17

## 2021-05-17 DIAGNOSIS — G57.01 PIRIFORMIS SYNDROME, RIGHT: ICD-10-CM

## 2021-05-17 DIAGNOSIS — G57.01 PIRIFORMIS SYNDROME, RIGHT: Primary | ICD-10-CM

## 2021-05-17 PROCEDURE — 72110 X-RAY EXAM L-2 SPINE 4/>VWS: CPT

## 2022-05-16 ENCOUNTER — OFFICE VISIT (OUTPATIENT)
Dept: OBSTETRICS AND GYNECOLOGY | Facility: CLINIC | Age: 32
End: 2022-05-16

## 2022-05-16 VITALS — BODY MASS INDEX: 28.38 KG/M2 | WEIGHT: 181.2 LBS | SYSTOLIC BLOOD PRESSURE: 124 MMHG | DIASTOLIC BLOOD PRESSURE: 90 MMHG

## 2022-05-16 DIAGNOSIS — Z30.09 ENCOUNTER FOR OTHER GENERAL COUNSELING OR ADVICE ON CONTRACEPTION: Primary | ICD-10-CM

## 2022-05-16 PROCEDURE — 99202 OFFICE O/P NEW SF 15 MIN: CPT | Performed by: NURSE PRACTITIONER

## 2022-05-16 RX ORDER — DEXTROAMPHETAMINE SACCHARATE, AMPHETAMINE ASPARTATE MONOHYDRATE, DEXTROAMPHETAMINE SULFATE AND AMPHETAMINE SULFATE 7.5; 7.5; 7.5; 7.5 MG/1; MG/1; MG/1; MG/1
30 CAPSULE, EXTENDED RELEASE ORAL
COMMUNITY
Start: 2022-04-04 | End: 2022-05-16

## 2022-05-16 NOTE — PROGRESS NOTES
Chief Complaint   Patient presents with   • discuss sterlization          Subjective   HPI  Julia Monge is a 32 y.o. female, , LMP was on No LMP recorded. Patient has had an implant. who presents for birth control counseling. Patient currently has the mirena IUD, and states that she would like a more permanent sterilization option. She states she has discussed this with  in the past. She understands the IUD is still effective until  but she is interested in permanent sterilization. She says both her and her  agree they do not want any more children.     Her periods are rare, lasting 2 days.  Dysmenorrhea:none.  Partner Status: Marital Status: .  The patient's current method of contraception is contraceptive methods: IUD.  Insertion date: .  She is not satisfied with her current method of birth control due to wanting a more permanent option. The patient reports additional symptoms as none.      Partner Status: Marital Status: .  New Partners since last visit: no.  Desires STD Screening: no.    Additional OB/GYN History   Last Pap : 10/8/19 negative  Last Completed Pap Smear     This patient has no relevant Health Maintenance data.        History of abnormal Pap smear: no    OB History        1    Para   1    Term   1            AB        Living   1       SAB        IAB        Ectopic        Molar        Multiple   0    Live Births   1                The additional following portions of the patient's history were reviewed and updated as appropriate: allergies, current medications, past family history, past medical history, past social history, past surgical history and problem list.    Review of Systems   Constitutional: Negative.    Cardiovascular: Negative.    Gastrointestinal: Negative.    Genitourinary: Negative.        I have reviewed and agree with the HPI, ROS, and historical information as entered above. DANISH Schuster    Objective   BP  124/90   Wt 82.2 kg (181 lb 3.2 oz)   BMI 28.38 kg/m²     Physical Exam  Vitals and nursing note reviewed.   Constitutional:       Appearance: Normal appearance. She is normal weight.   Neurological:      Mental Status: She is alert.         Assessment & Plan     Assessment     Problem List Items Addressed This Visit    None     Visit Diagnoses     Encounter for other general counseling or advice on contraception    -  Primary          1. Pt. Interested in permanent sterilization. She is using Mirena IUD presently and device expires in 2024. She wants something more permanent and says she has discussed this with  in the past.  2. Information given to patient on permanent sterilization. Will discuss pt. Request with  and call patient back with plan.       Ivone Pearson, APRN  05/16/2022

## 2022-06-24 ENCOUNTER — TELEPHONE (OUTPATIENT)
Dept: OBSTETRICS AND GYNECOLOGY | Facility: CLINIC | Age: 32
End: 2022-06-24

## 2022-06-24 NOTE — TELEPHONE ENCOUNTER
Patient called and said she had an apt a couple weeks ago with hermes about a tubal ligation and said angeli was going to talk to juno and we would get back with her and we haven't

## 2022-06-24 NOTE — TELEPHONE ENCOUNTER
Informed Ivone is out today and so is LOS, I would check with Ivone on Monday. Pt. Notified and VU she will call the end of next week if she has not heard from us.

## 2022-06-30 NOTE — TELEPHONE ENCOUNTER
Spoke with LOS: Ok for bilateral salpingectomy. Given to SEAN Jackson for scheduling. Pt. Notified.

## 2022-07-01 ENCOUNTER — TELEPHONE (OUTPATIENT)
Dept: OBSTETRICS AND GYNECOLOGY | Facility: CLINIC | Age: 32
End: 2022-07-01

## 2022-07-22 DIAGNOSIS — Z01.818 PRE-OP TESTING: Primary | ICD-10-CM

## 2022-07-28 ENCOUNTER — OFFICE VISIT (OUTPATIENT)
Dept: OBSTETRICS AND GYNECOLOGY | Facility: CLINIC | Age: 32
End: 2022-07-28

## 2022-07-28 DIAGNOSIS — Z01.818 PREOP EXAMINATION: Primary | ICD-10-CM

## 2022-07-28 DIAGNOSIS — Z30.2 ENCOUNTER FOR STERILIZATION: ICD-10-CM

## 2022-07-28 PROCEDURE — 99213 OFFICE O/P EST LOW 20 MIN: CPT | Performed by: OBSTETRICS & GYNECOLOGY

## 2022-07-28 RX ORDER — DEXTROAMPHETAMINE SACCHARATE, AMPHETAMINE ASPARTATE MONOHYDRATE, DEXTROAMPHETAMINE SULFATE AND AMPHETAMINE SULFATE 7.5; 7.5; 7.5; 7.5 MG/1; MG/1; MG/1; MG/1
CAPSULE, EXTENDED RELEASE ORAL
COMMUNITY
Start: 2022-07-27

## 2022-07-28 NOTE — PROGRESS NOTES
Gynecologic Preoperative Exam Note        Subjective   Julia Monge is a 32 y.o. year old  who is scheduled for bilateral salpingectomy at [facility] on 2022 at [facility].  COVID testing has been scheduled for 2022 at [facility]. Her pre operative diagnosis is Desire for Permanent Sterilization. No LMP recorded. Patient has had an implant.. Her birth control method is Mirena.  Her BMI is There is no height or weight on file to calculate BMI..      She has reviewed the informational pamphlet on 2022.    She understands the risks of bleeding, infection, possible damage to other organ systems, including but not limited to the gastrointestinal tract and genitourinary tract.  She also understands the specific risks listed in the preop information (video, pamphlets, etc.).    She has reviewed and signed the preop consent form.    She has been instructed to have a light dinner the night before surgery, then nothing to eat or drink after midnight.  The day of surgery do not chew gum or smoke.  Remove all jewelry, nail polish, contact lenses prior to coming to the hospital.  Do not bring valuables or large sums of money with you. Patient was instructed on what time to arrive and where to check in, maps were given.  She was instructed that she will meet an Anesthesiologist and that an IV will be started to provide fluids and sedation.  The total time of procedure was discussed.  She was instructed that she will need a .      No Known Allergies  She has confirmed that she is not allergic to Latex.     She is on the following medications. These were reviewed with the patient today and instructed on which medications are ok to take with a sip of water prior to the surgery.      Current Outpatient Medications:   •  amphetamine-dextroamphetamine XR (ADDERALL XR) 30 MG 24 hr capsule, , Disp: , Rfl:   •  sertraline (ZOLOFT) 50 MG tablet, 25 mg., Disp: , Rfl:   •  ibuprofen (ADVIL,MOTRIN) 600 MG tablet,  Take 1 tablet by mouth Every 6 (Six) Hours As Needed for Mild Pain (1-3)., Disp: 30 tablet, Rfl: 0  •  Prenatal Vit-Fe Fumarate-FA (PRENATAL 27-) 27-1 MG tablet tablet, Take  by mouth Daily., Disp: , Rfl:      Past Medical History:   Diagnosis Date   • Depression      Past Surgical History:   Procedure Laterality Date   • RHINOPLASTY     • TONSILLECTOMY     • WISDOM TOOTH EXTRACTION       OB History    Para Term  AB Living   1 1 1 0 0 1   SAB IAB Ectopic Molar Multiple Live Births   0 0 0 0 0 1      # Outcome Date GA Lbr Morales/2nd Weight Sex Delivery Anes PTL Lv   1 Term 17 39w2d 14:11 / 02:23 3670 g (8 lb 1.5 oz) F Vag-Spont EPI N MARITZA      Name: ENRIKE ALFARO      Apgar1: 7  Apgar5: 9     Social History     Tobacco Use   Smoking Status Never Smoker   Smokeless Tobacco Never Used     Social History     Substance and Sexual Activity   Alcohol Use Yes    Comment: socially     Social History     Substance and Sexual Activity   Drug Use Never         Review of Systems   Constitutional: Negative.    HENT: Negative.    Eyes: Negative.    Respiratory: Negative.    Cardiovascular: Negative.    Gastrointestinal: Negative.    Endocrine: Negative.    Genitourinary: Negative.    Musculoskeletal: Negative.    Skin: Negative.    Allergic/Immunologic: Negative.    Neurological: Negative.    Hematological: Negative.    Psychiatric/Behavioral: Negative.            Objective      Physical Exam  Vitals and nursing note reviewed. Exam conducted with a chaperone present.   Constitutional:       Appearance: She is well-developed.   HENT:      Head: Normocephalic and atraumatic.   Cardiovascular:      Rate and Rhythm: Normal rate and regular rhythm.   Pulmonary:      Effort: Pulmonary effort is normal.      Breath sounds: Normal breath sounds.   Abdominal:      General: Bowel sounds are normal.      Palpations: Abdomen is soft. Abdomen is not rigid.   Musculoskeletal:      Cervical back: Normal range of motion.  No muscular tenderness.   Skin:     General: Skin is warm and dry.   Neurological:      Mental Status: She is alert and oriented to person, place, and time.   Psychiatric:         Behavior: Behavior normal.         Assessment   Problem List Items Addressed This Visit    None     Visit Diagnoses     Preop examination    -  Primary    Relevant Orders    CBC & Differential (Completed)    HCG, B-subunit, Quantitative (Completed)    Encounter for sterilization                       Plan   1. Plan for bilateral salpingectomy, banding if unable.  2. Risks of surgery were reviewed with the patient including risks of bleeding, infection, damage to other organ systems including, but not limited to GI and  tracts (bowel, bladder, blood vessels, nerves) risks of Anesthesia, as well as the risk the surgery will not produce the desired results, possible need for additional surgery, death, risk of uterine perforation.  3. Understands risk of failure and regret.    4. Glenn has been obtained and reviewed   5. Pain Medication Consent Form has been signed.  A review regarding proper medication administration, impact on driving and working while medicated, the safety of use in pregnancy, the potential for overdose and the proper disposal and storage of controlled medications has been done with the patient.          Geni Selby MD  7/29/2022

## 2022-07-29 ENCOUNTER — CLINICAL SUPPORT NO REQUIREMENTS (OUTPATIENT)
Dept: PREADMISSION TESTING | Facility: HOSPITAL | Age: 32
End: 2022-07-29

## 2022-07-29 LAB
BASOPHILS # BLD AUTO: 0.02 10*3/MM3 (ref 0–0.2)
BASOPHILS NFR BLD AUTO: 0.4 % (ref 0–1.5)
EOSINOPHIL # BLD AUTO: 0.05 10*3/MM3 (ref 0–0.4)
EOSINOPHIL NFR BLD AUTO: 1.1 % (ref 0.3–6.2)
ERYTHROCYTE [DISTWIDTH] IN BLOOD BY AUTOMATED COUNT: 12 % (ref 12.3–15.4)
HCG INTACT+B SERPL-ACNC: <1 MIU/ML
HCT VFR BLD AUTO: 41.3 % (ref 34–46.6)
HGB BLD-MCNC: 14.3 G/DL (ref 12–15.9)
IMM GRANULOCYTES # BLD AUTO: 0.02 10*3/MM3 (ref 0–0.05)
IMM GRANULOCYTES NFR BLD AUTO: 0.4 % (ref 0–0.5)
LYMPHOCYTES # BLD AUTO: 1.24 10*3/MM3 (ref 0.7–3.1)
LYMPHOCYTES NFR BLD AUTO: 26.2 % (ref 19.6–45.3)
MCH RBC QN AUTO: 31.5 PG (ref 26.6–33)
MCHC RBC AUTO-ENTMCNC: 34.6 G/DL (ref 31.5–35.7)
MCV RBC AUTO: 91 FL (ref 79–97)
MONOCYTES # BLD AUTO: 0.34 10*3/MM3 (ref 0.1–0.9)
MONOCYTES NFR BLD AUTO: 7.2 % (ref 5–12)
NEUTROPHILS # BLD AUTO: 3.07 10*3/MM3 (ref 1.7–7)
NEUTROPHILS NFR BLD AUTO: 64.7 % (ref 42.7–76)
NRBC BLD AUTO-RTO: 0 /100 WBC (ref 0–0.2)
PLATELET # BLD AUTO: 237 10*3/MM3 (ref 140–450)
RBC # BLD AUTO: 4.54 10*6/MM3 (ref 3.77–5.28)
WBC # BLD AUTO: 4.74 10*3/MM3 (ref 3.4–10.8)

## 2022-07-29 PROCEDURE — U0004 COV-19 TEST NON-CDC HGH THRU: HCPCS

## 2022-07-29 PROCEDURE — C9803 HOPD COVID-19 SPEC COLLECT: HCPCS

## 2022-07-30 LAB — SARS-COV-2 RNA PNL SPEC NAA+PROBE: NOT DETECTED

## 2022-08-01 ENCOUNTER — OUTSIDE FACILITY SERVICE (OUTPATIENT)
Dept: OBSTETRICS AND GYNECOLOGY | Facility: CLINIC | Age: 32
End: 2022-08-01

## 2022-08-01 ENCOUNTER — LAB REQUISITION (OUTPATIENT)
Dept: LAB | Facility: HOSPITAL | Age: 32
End: 2022-08-01

## 2022-08-01 DIAGNOSIS — Z30.2 ENCOUNTER FOR STERILIZATION: ICD-10-CM

## 2022-08-01 PROCEDURE — 58661 LAPAROSCOPY REMOVE ADNEXA: CPT | Performed by: OBSTETRICS & GYNECOLOGY

## 2022-08-01 PROCEDURE — 88302 TISSUE EXAM BY PATHOLOGIST: CPT | Performed by: OBSTETRICS & GYNECOLOGY

## 2022-08-01 RX ORDER — HYDROCODONE BITARTRATE AND ACETAMINOPHEN 5; 325 MG/1; MG/1
1 TABLET ORAL EVERY 6 HOURS PRN
Qty: 10 TABLET | Refills: 0 | Status: SHIPPED | OUTPATIENT
Start: 2022-08-01 | End: 2022-11-07

## 2022-08-02 LAB
CYTO UR: NORMAL
LAB AP CASE REPORT: NORMAL
LAB AP CLINICAL INFORMATION: NORMAL
PATH REPORT.FINAL DX SPEC: NORMAL
PATH REPORT.GROSS SPEC: NORMAL

## 2022-08-16 ENCOUNTER — OFFICE VISIT (OUTPATIENT)
Dept: OBSTETRICS AND GYNECOLOGY | Facility: CLINIC | Age: 32
End: 2022-08-16

## 2022-08-16 VITALS
WEIGHT: 185 LBS | HEIGHT: 67 IN | SYSTOLIC BLOOD PRESSURE: 102 MMHG | DIASTOLIC BLOOD PRESSURE: 72 MMHG | BODY MASS INDEX: 29.03 KG/M2

## 2022-08-16 DIAGNOSIS — Z48.89 POSTOPERATIVE VISIT: Primary | ICD-10-CM

## 2022-08-16 DIAGNOSIS — Z90.79 STATUS POST BILATERAL SALPINGECTOMY: ICD-10-CM

## 2022-08-16 PROCEDURE — 99212 OFFICE O/P EST SF 10 MIN: CPT | Performed by: OBSTETRICS & GYNECOLOGY

## 2022-08-16 NOTE — PROGRESS NOTES
"     OBGYN Postoperative Exam Note          Subjective   No chief complaint on file.    Julia Monge is a 32 y.o. year old  presenting to be seen for her post-operative visit. She is S/P bilateral salpingectomy on 2022 at [facility] for permanent surgical sterilization. Currently she reports no problems with eating, bowel movements, voiding, or wound drainage and pain is well controlled.    The pathology results from her procedure are in Julia's record and are benign.      OTHER THINGS SHE WANTS TO DISCUSS TODAY:  Nothing else      Current Outpatient Medications:   •  amphetamine-dextroamphetamine XR (ADDERALL XR) 30 MG 24 hr capsule, , Disp: , Rfl:   •  sertraline (ZOLOFT) 50 MG tablet, 25 mg., Disp: , Rfl:   •  HYDROcodone-acetaminophen (Norco) 5-325 MG per tablet, Take 1 tablet by mouth Every 6 (Six) Hours As Needed., Disp: 10 tablet, Rfl: 0     Past Medical History:   Diagnosis Date   • Depression         Past Surgical History:   Procedure Laterality Date   • BILATERAL SALPINGO OOPHORECTOMY Bilateral 2022   • RHINOPLASTY     • TONSILLECTOMY     • WISDOM TOOTH EXTRACTION         The following portions of the patient's history were reviewed and updated as appropriate:current medications and allergies    Review of Systems   Constitutional: Negative.    HENT: Negative.    Eyes: Negative.    Respiratory: Negative.    Cardiovascular: Negative.    Gastrointestinal: Negative.    Endocrine: Negative.    Genitourinary: Negative.    Musculoskeletal: Negative.    Skin: Negative.    Allergic/Immunologic: Negative.    Neurological: Negative.    Hematological: Negative.    Psychiatric/Behavioral: Negative.           Objective   /72   Ht 170.2 cm (67\")   Wt 83.9 kg (185 lb)   BMI 28.98 kg/m²     Physical Exam  Vitals and nursing note reviewed.   Constitutional:       Appearance: She is well-developed.   HENT:      Head: Normocephalic and atraumatic.   Pulmonary:      Effort: Pulmonary effort is " normal.   Abdominal:      General: A surgical scar is present.      Palpations: Abdomen is soft. Abdomen is not rigid.      Comments: Clean, Dry, and Intact.  No erythema.    Musculoskeletal:      Cervical back: Normal range of motion.   Neurological:      Mental Status: She is alert and oriented to person, place, and time.   Psychiatric:         Mood and Affect: Mood normal.         Behavior: Behavior normal.              Assessment   1. S/P bilateral salpingectomy     Plan   1. May return to full activity with no restrictions  2. The importance of keeping all planned follow-up and taking all medications as prescribed was emphasized.  3. Continue annual well woman exams             Geni Selby MD  08/16/2022

## 2022-11-02 ENCOUNTER — TRANSCRIBE ORDERS (OUTPATIENT)
Dept: ADMINISTRATIVE | Facility: HOSPITAL | Age: 32
End: 2022-11-02

## 2022-11-02 ENCOUNTER — HOSPITAL ENCOUNTER (OUTPATIENT)
Dept: GENERAL RADIOLOGY | Facility: HOSPITAL | Age: 32
Discharge: HOME | End: 2022-11-02
Admitting: INTERNAL MEDICINE

## 2022-11-02 DIAGNOSIS — M25.559 HIP PAIN: ICD-10-CM

## 2022-11-02 DIAGNOSIS — M25.559 HIP PAIN: Primary | ICD-10-CM

## 2022-11-02 PROCEDURE — 73502 X-RAY EXAM HIP UNI 2-3 VIEWS: CPT

## 2022-11-07 ENCOUNTER — OFFICE VISIT (OUTPATIENT)
Dept: ORTHOPEDIC SURGERY | Facility: CLINIC | Age: 32
End: 2022-11-07

## 2022-11-07 VITALS
DIASTOLIC BLOOD PRESSURE: 73 MMHG | SYSTOLIC BLOOD PRESSURE: 114 MMHG | WEIGHT: 182.2 LBS | BODY MASS INDEX: 28.6 KG/M2 | HEIGHT: 67 IN

## 2022-11-07 DIAGNOSIS — G89.29 CHRONIC RIGHT HIP PAIN: Primary | ICD-10-CM

## 2022-11-07 DIAGNOSIS — M25.551 CHRONIC RIGHT HIP PAIN: Primary | ICD-10-CM

## 2022-11-07 PROCEDURE — 99204 OFFICE O/P NEW MOD 45 MIN: CPT | Performed by: ORTHOPAEDIC SURGERY

## 2022-11-07 NOTE — PROGRESS NOTES
[facility] Note    Subjective     Chief Complaint   Patient presents with   • Right Hip - Pain        HPI    Julia Monge is a 32 y.o. female who presents with new problem of: right hip pain.  Onset: atraumatic and gradual in nature. The issue has been ongoing for 1 year(s). Pain is a 8/10 on the pain scale. Pain is described as burning and stabbing. Associated symptoms include pain, stiffness and giving way/buckling. The pain is worse with sitting, leisure and lying on affected side; ice and medication and lying down improve the pain. Previous treatments have included: NSAIDS, physical therapy and weight loss.  Pain is located in the posterior and lateral aspect of the hip.  It does radiate into the lower back.  No groin pain.  No numbness or tingling.    I have reviewed the following portions of the patient's history and agree with: History of Present Illness and Review of Systems    Patient Active Problem List   Diagnosis   • Status post bilateral salpingectomy     Past Medical History:   Diagnosis Date   • Depression       Past Surgical History:   Procedure Laterality Date   • BILATERAL SALPINGO OOPHORECTOMY Bilateral 08/01/2022   • RHINOPLASTY     • TONSILLECTOMY     • WISDOM TOOTH EXTRACTION        Family History   Problem Relation Age of Onset   • Skin cancer Mother    • Skin cancer Brother    • Skin cancer Maternal Aunt    • Breast cancer Neg Hx    • Uterine cancer Neg Hx    • Ovarian cancer Neg Hx    • Colon cancer Neg Hx      Social History     Socioeconomic History   • Marital status:    • Number of children: 1   Tobacco Use   • Smoking status: Never   • Smokeless tobacco: Never   Vaping Use   • Vaping Use: Never used   Substance and Sexual Activity   • Alcohol use: Yes     Comment: socially   • Drug use: Never   • Sexual activity: Yes     Partners: Male     Birth control/protection: Surgical      Current Outpatient Medications on File Prior to Visit   Medication Sig  Dispense Refill   • amphetamine-dextroamphetamine XR (ADDERALL XR) 30 MG 24 hr capsule      • sertraline (ZOLOFT) 50 MG tablet 25 mg.     • [DISCONTINUED] HYDROcodone-acetaminophen (Norco) 5-325 MG per tablet Take 1 tablet by mouth Every 6 (Six) Hours As Needed. 10 tablet 0     No current facility-administered medications on file prior to visit.      No Known Allergies     Review of Systems   Constitutional: Negative for activity change, appetite change, chills, diaphoresis, fatigue, fever and unexpected weight change.   HENT: Negative for congestion, dental problem, drooling, ear discharge, ear pain, facial swelling, hearing loss, mouth sores, nosebleeds, postnasal drip, rhinorrhea, sinus pressure, sneezing, sore throat, tinnitus, trouble swallowing and voice change.    Eyes: Negative for photophobia, pain, discharge, redness, itching and visual disturbance.   Respiratory: Negative for apnea, cough, choking, chest tightness, shortness of breath, wheezing and stridor.    Cardiovascular: Negative for chest pain, palpitations and leg swelling.   Gastrointestinal: Negative for abdominal distention, abdominal pain, anal bleeding, blood in stool, constipation, diarrhea, nausea, rectal pain and vomiting.   Endocrine: Negative for cold intolerance, heat intolerance, polydipsia, polyphagia and polyuria.   Genitourinary: Negative for decreased urine volume, difficulty urinating, dysuria, enuresis, flank pain, frequency, genital sores, hematuria and urgency.   Musculoskeletal: Positive for arthralgias. Negative for back pain, gait problem, joint swelling, myalgias, neck pain and neck stiffness.   Skin: Negative for color change, pallor, rash and wound.   Allergic/Immunologic: Negative for environmental allergies, food allergies and immunocompromised state.   Neurological: Negative for dizziness, tremors, seizures, syncope, facial asymmetry, speech difficulty, weakness, light-headedness, numbness and headaches.  "  Hematological: Negative for adenopathy. Does not bruise/bleed easily.   Psychiatric/Behavioral: Negative for agitation, behavioral problems, confusion, decreased concentration, dysphoric mood, hallucinations, self-injury, sleep disturbance and suicidal ideas. The patient is not nervous/anxious and is not hyperactive.         Objective      Physical Exam  /73   Ht 170.2 cm (67.01\")   Wt 82.6 kg (182 lb 3.2 oz)   BMI 28.53 kg/m²     Body mass index is 28.53 kg/m².    General:   Mental Status:  Alert   Appearance: Cooperative, in no acute distress   Build and Nutrition: Well-nourished well-developed female   Orientation: Alert and oriented to person, place and time   Posture: Normal   Gait: Nonantalgic/normal    Integument:   Right hip: No skin lesions, no rash, no ecchymosis    Neurologic:   Sensation:    Right foot: Intact to light touch on the dorsal and plantar aspect   Motor:  Right lower extremity: 5/5 quadriceps, hamstrings, ankle dorsiflexors, and ankle plantar flexors    Vascular:   Right lower extremity: 2+ dorsalis pedis pulse, prompt capillary refill    Lower Extremities:   Right Hip:    Tenderness:  Mild lateral tenderness    Swelling: None    Crepitus:  None    Atrophy:  None    Range of motion:  External Rotation: 40°       Internal Rotation: 40°       Flexion:  100°       Extension:  0°   Instability:  None  Deformities:  None  Functional testing: Mildly positive Stinchfield    No leg length discrepancy      Imaging/Studies      Imaging Results (Last 24 Hours)     ** No results found for the last 24 hours. **        DATE OF EXAM: 11/2/2022 11:14 AM     PROCEDURE: XR HIP W OR WO PELVIS 2-3 VIEW RIGHT-     INDICATIONS: hip pain; M25.559-Pain in unspecified hip     COMPARISON: No comparisons available.     TECHNIQUE: AP and Lateral views of the right hip with Pelvis were  obtained.     FINDINGS:  Mild right and minimal left hip arthrosis is present, with some minimal  narrowing and sclerosis. " Cortical margins are otherwise intact and  alignment is maintained without evidence of fracture or dislocation. The  SI joints are symmetric.      IMPRESSION:  Mild right and minimal left hip arthrosis is present, with some minimal  narrowing and sclerosis. Cortical margins are otherwise intact and  alignment is maintained without evidence of fracture or dislocation. The  SI joints are symmetric.      This report was finalized on 11/2/2022 1:04 PM by Alfredo Boyle.    I reviewed the above imaging, and overall agree with findings.    Assessment and Plan     Diagnoses and all orders for this visit:    1. Chronic right hip pain (Primary)  -     MRI Hip Right Without Contrast; Future        1. Chronic right hip pain        I reviewed my findings with the patient.  She has persistent right hip pain, and at this point I recommend an MRI for further evaluation, specifically looking at the labrum and cartilage.  I will see her back after the MRI has been completed for review.  I will see her back sooner for any problems.    Return for After Imaging Study.      Ulices Kwon MD  11/07/22  13:50 EST

## 2022-12-15 ENCOUNTER — HOSPITAL ENCOUNTER (OUTPATIENT)
Dept: MRI IMAGING | Facility: HOSPITAL | Age: 32
Discharge: HOME | End: 2022-12-15
Admitting: ORTHOPAEDIC SURGERY

## 2022-12-15 DIAGNOSIS — G89.29 CHRONIC RIGHT HIP PAIN: ICD-10-CM

## 2022-12-15 DIAGNOSIS — M25.551 CHRONIC RIGHT HIP PAIN: ICD-10-CM

## 2022-12-15 PROCEDURE — 73721 MRI JNT OF LWR EXTRE W/O DYE: CPT

## 2022-12-21 ENCOUNTER — OFFICE VISIT (OUTPATIENT)
Dept: ORTHOPEDIC SURGERY | Facility: CLINIC | Age: 32
End: 2022-12-21

## 2022-12-21 VITALS — BODY MASS INDEX: 29.19 KG/M2 | WEIGHT: 186 LBS | HEIGHT: 67 IN

## 2022-12-21 DIAGNOSIS — S73.191A TEAR OF RIGHT ACETABULAR LABRUM, INITIAL ENCOUNTER: Primary | ICD-10-CM

## 2022-12-21 PROCEDURE — 99214 OFFICE O/P EST MOD 30 MIN: CPT | Performed by: ORTHOPAEDIC SURGERY

## 2022-12-21 NOTE — PROGRESS NOTES
[facility] [place] [facility] [place]    Subjective     Chief Complaint   Patient presents with   • Follow-up     6 week  MRI (12/15/22) recheck - Chronic right hip pain         HPI    It has been 6  week(s) since Ms. Monge's last visit. She returns to clinic today for follow-up of right hip pain. The issue has been ongoing for 1 year(s). She rates her pain a 7-9/10 on the pain scale. Previous/current treatments: physical therapy and weight loss. Current symptoms: same as prior visit. The pain is worse with walking, standing, sitting, working, leisure, lying on affected side and any movement of the joint; ice and lying down improve the pain. Overall, she is doing worse.  Here today to review the MRI results.  No new complaints today.  She has tried physical therapy without long-term benefit.    I have reviewed the following portions of the patient's history and agree with: History of Present Illness and Review of Systems    Patient Active Problem List   Diagnosis   • Status post bilateral salpingectomy     Past Medical History:   Diagnosis Date   • Depression       Past Surgical History:   Procedure Laterality Date   • BILATERAL SALPINGO OOPHORECTOMY Bilateral 08/01/2022   • RHINOPLASTY     • TONSILLECTOMY     • WISDOM TOOTH EXTRACTION        Family History   Problem Relation Age of Onset   • Skin cancer Mother    • Cancer Mother         Skin   • Skin cancer Brother    • Skin cancer Maternal Aunt    • Cancer Maternal Grandmother         Lung   • Cancer Maternal Aunt         Skin   • Breast cancer Neg Hx    • Uterine cancer Neg Hx    • Ovarian cancer Neg Hx    • Colon cancer Neg Hx      Social History     Socioeconomic History   • Marital status:    • Number of children: 1   Tobacco Use   • Smoking status: Never   • Smokeless tobacco: Never   Vaping Use   • Vaping Use: Never used   Substance and Sexual Activity   • Alcohol use: Yes     Alcohol/week: 1.0 - 2.0 standard drink     Types: 1 - 2 Glasses of  wine per week     Comment: socially   • Drug use: Never   • Sexual activity: Yes     Partners: Male     Birth control/protection: Surgical      Current Outpatient Medications on File Prior to Visit   Medication Sig Dispense Refill   • amphetamine-dextroamphetamine XR (ADDERALL XR) 30 MG 24 hr capsule      • sertraline (ZOLOFT) 50 MG tablet 25 mg.       No current facility-administered medications on file prior to visit.      No Known Allergies     Review of Systems   Constitutional: Negative for activity change, appetite change, chills, diaphoresis, fatigue, fever and unexpected weight change.   HENT: Negative for congestion, dental problem, drooling, ear discharge, ear pain, facial swelling, hearing loss, mouth sores, nosebleeds, postnasal drip, rhinorrhea, sinus pressure, sneezing, sore throat, tinnitus, trouble swallowing and voice change.    Eyes: Negative for photophobia, pain, discharge, redness, itching and visual disturbance.   Respiratory: Negative for apnea, cough, choking, chest tightness, shortness of breath, wheezing and stridor.    Cardiovascular: Negative for chest pain, palpitations and leg swelling.   Gastrointestinal: Negative for abdominal distention, abdominal pain, anal bleeding, blood in stool, constipation, diarrhea, nausea, rectal pain and vomiting.   Endocrine: Negative for cold intolerance, heat intolerance, polydipsia, polyphagia and polyuria.   Genitourinary: Negative for decreased urine volume, difficulty urinating, dysuria, enuresis, flank pain, frequency, genital sores, hematuria and urgency.   Musculoskeletal: Positive for arthralgias. Negative for back pain, gait problem, joint swelling, myalgias, neck pain and neck stiffness.   Skin: Negative for color change, pallor, rash and wound.   Allergic/Immunologic: Negative for environmental allergies, food allergies and immunocompromised state.   Neurological: Negative for dizziness, tremors, seizures, syncope, facial asymmetry, speech  "difficulty, weakness, light-headedness, numbness and headaches.   Hematological: Negative for adenopathy. Does not bruise/bleed easily.   Psychiatric/Behavioral: Negative for agitation, behavioral problems, confusion, decreased concentration, dysphoric mood, hallucinations, self-injury, sleep disturbance and suicidal ideas. The patient is not nervous/anxious and is not hyperactive.         Objective      Physical Exam  Ht 170.2 cm (67.01\")   Wt 84.4 kg (186 lb)   BMI 29.12 kg/m²     Body mass index is 29.12 kg/m².    General:   Mental Status:  Alert   Appearance: Cooperative, in no acute distress   Build and Nutrition: Well-nourished well-developed female   Orientation: Alert and oriented to person, place and time   Posture: Normal   Gait: Nonantalgic    Lower Extremities:              Right Hip:                          Tenderness:     None                          Swelling:          None                          Crepitus:          None                          Atrophy:           None                          Range of motion:        External Rotation:       40°                                                              Internal Rotation:        40°                                                              Flexion:                       100°                                                              Extension:                   0°           Instability:        None  Deformities:     None  Functional testing: [facility]                          No leg length discrepancy    Imaging/Studies  Imaging Results (Last 24 Hours)     ** No results found for the last 24 hours. **        DATE OF EXAM: 12/15/2022 7:04 AM     PROCEDURE: MRI HIP RIGHT WO CONTRAST-     INDICATIONS: Hip pain, chronic, articular cartilage eval, xray done;  M25.551-Pain in right hip; G89.29-Other chronic pain     COMPARISON: No comparisons available.     TECHNIQUE: Multiplanar multisequence MRI of the right hip without  contrast.   "   FINDINGS:   Full view pelvis: No evidence of fracture. Left hip appears grossly  intact. Pelvic organs are unremarkable. Intrauterine device is seen.  Flow voids are maintained. Hamstrings, rectus femoris, gluteal and  adductor tendons are intact. Iliopsoas is intact. Likely minimal strain  in the left gluteus medius muscle belly. Visualized sciatic nerves  appear intact.     Right hip: Tear of the anterosuperior labrum. No evidence of fracture.  Minimal chondral irregularity of the superior acetabular cartilage. No  joint effusion. No subchondral edema. Ligamentum teres appears intact.     IMPRESSION:  Tear of the anterior superior labrum of the right hip.     Minimal chondral irregularity of the right hip superior acetabular  cartilage.     This report was finalized on 12/16/2022 5:17 PM by Michael Das.    I reviewed the above imaging, and agree with findings.  Assessment and Plan     Diagnoses and all orders for this visit:    1. Tear of right acetabular labrum, initial encounter (Primary)  -     Ambulatory Referral to Orthopedic Surgery        1. Tear of right acetabular labrum, initial encounter        I reviewed my findings with the patient today.  MRI shows a labral tear, and at this point we discussed options.  She would like to consider hip arthroscopy, and we will refer her for consideration with Dr. Prabhakar.    Return for Referral to Dr. Prabhakar.      Ulices Kwon MD  12/21/22  08:19 EST

## 2023-01-19 ENCOUNTER — ANESTHESIA EVENT (OUTPATIENT)
Dept: PERIOP | Facility: HOSPITAL | Age: 33
End: 2023-01-19
Payer: COMMERCIAL

## 2023-01-19 RX ORDER — FAMOTIDINE 10 MG/ML
20 INJECTION, SOLUTION INTRAVENOUS ONCE
Status: CANCELLED | OUTPATIENT
Start: 2023-01-19 | End: 2023-01-19

## 2023-01-20 ENCOUNTER — ANESTHESIA (OUTPATIENT)
Dept: PERIOP | Facility: HOSPITAL | Age: 33
End: 2023-01-20
Payer: COMMERCIAL

## 2023-01-20 ENCOUNTER — APPOINTMENT (OUTPATIENT)
Dept: GENERAL RADIOLOGY | Facility: HOSPITAL | Age: 33
End: 2023-01-20
Payer: COMMERCIAL

## 2023-01-20 ENCOUNTER — ANESTHESIA EVENT CONVERTED (OUTPATIENT)
Dept: ANESTHESIOLOGY | Facility: HOSPITAL | Age: 33
End: 2023-01-20
Payer: COMMERCIAL

## 2023-01-20 ENCOUNTER — HOSPITAL ENCOUNTER (OUTPATIENT)
Facility: HOSPITAL | Age: 33
Setting detail: HOSPITAL OUTPATIENT SURGERY
Discharge: HOME | End: 2023-01-20
Attending: ORTHOPAEDIC SURGERY | Admitting: ORTHOPAEDIC SURGERY
Payer: COMMERCIAL

## 2023-01-20 VITALS
BODY MASS INDEX: 29.19 KG/M2 | RESPIRATION RATE: 16 BRPM | OXYGEN SATURATION: 94 % | HEIGHT: 67 IN | TEMPERATURE: 98.6 F | HEART RATE: 100 BPM | SYSTOLIC BLOOD PRESSURE: 117 MMHG | DIASTOLIC BLOOD PRESSURE: 74 MMHG | WEIGHT: 186 LBS

## 2023-01-20 LAB
B-HCG UR QL: NEGATIVE
EXPIRATION DATE: NORMAL
INTERNAL NEGATIVE CONTROL: NEGATIVE
INTERNAL POSITIVE CONTROL: POSITIVE
Lab: NORMAL

## 2023-01-20 PROCEDURE — 25010000002 CEFAZOLIN IN DEXTROSE 2-4 GM/100ML-% SOLUTION: Performed by: ORTHOPAEDIC SURGERY

## 2023-01-20 PROCEDURE — 25010000002 EPINEPHRINE PER 0.1 MG: Performed by: ORTHOPAEDIC SURGERY

## 2023-01-20 PROCEDURE — 81025 URINE PREGNANCY TEST: CPT | Performed by: ORTHOPAEDIC SURGERY

## 2023-01-20 PROCEDURE — 25010000002 DEXAMETHASONE SODIUM PHOSPHATE 10 MG/ML SOLUTION: Performed by: NURSE ANESTHETIST, CERTIFIED REGISTERED

## 2023-01-20 PROCEDURE — 25010000002 PROPOFOL 10 MG/ML EMULSION: Performed by: NURSE ANESTHETIST, CERTIFIED REGISTERED

## 2023-01-20 PROCEDURE — 76000 FLUOROSCOPY <1 HR PHYS/QHP: CPT

## 2023-01-20 PROCEDURE — 29916 HIP ARTHRO W/LABRAL REPAIR: CPT

## 2023-01-20 PROCEDURE — 25010000002 HYDROMORPHONE 1 MG/ML SOLUTION

## 2023-01-20 PROCEDURE — 25010000002 DEXAMETHASONE PER 1 MG: Performed by: NURSE ANESTHETIST, CERTIFIED REGISTERED

## 2023-01-20 PROCEDURE — C1713 ANCHOR/SCREW BN/BN,TIS/BN: HCPCS | Performed by: ORTHOPAEDIC SURGERY

## 2023-01-20 PROCEDURE — 25010000002 FENTANYL CITRATE (PF) 50 MCG/ML SOLUTION

## 2023-01-20 PROCEDURE — 25010000002 FENTANYL CITRATE (PF) 100 MCG/2ML SOLUTION: Performed by: NURSE ANESTHETIST, CERTIFIED REGISTERED

## 2023-01-20 PROCEDURE — 25010000002 FENTANYL CITRATE (PF) 50 MCG/ML SOLUTION: Performed by: NURSE ANESTHETIST, CERTIFIED REGISTERED

## 2023-01-20 PROCEDURE — 25010000002 ONDANSETRON PER 1 MG: Performed by: ANESTHESIOLOGY

## 2023-01-20 PROCEDURE — 29914 HIP ARTHRO W/FEMOROPLASTY: CPT

## 2023-01-20 DEVICE — NANOTACK SUTURE ANCHOR 1.4MM WITH FLEX INSERTER
Type: IMPLANTABLE DEVICE | Site: HIP | Status: FUNCTIONAL
Brand: NANOTACK

## 2023-01-20 RX ORDER — OXYCODONE HYDROCHLORIDE 5 MG/1
TABLET ORAL
Status: COMPLETED
Start: 2023-01-20 | End: 2023-01-20

## 2023-01-20 RX ORDER — FENTANYL CITRATE 50 UG/ML
INJECTION, SOLUTION INTRAMUSCULAR; INTRAVENOUS
Status: COMPLETED
Start: 2023-01-20 | End: 2023-01-20

## 2023-01-20 RX ORDER — FENTANYL CITRATE 50 UG/ML
INJECTION, SOLUTION INTRAMUSCULAR; INTRAVENOUS AS NEEDED
Status: DISCONTINUED | OUTPATIENT
Start: 2023-01-20 | End: 2023-01-20 | Stop reason: SDUPTHER

## 2023-01-20 RX ORDER — BUPIVACAINE HYDROCHLORIDE 2.5 MG/ML
INJECTION, SOLUTION EPIDURAL; INFILTRATION; INTRACAUDAL
Status: COMPLETED | OUTPATIENT
Start: 2023-01-20 | End: 2023-01-20

## 2023-01-20 RX ORDER — MIDAZOLAM HYDROCHLORIDE 1 MG/ML
1 INJECTION INTRAMUSCULAR; INTRAVENOUS
Status: DISCONTINUED | OUTPATIENT
Start: 2023-01-20 | End: 2023-01-20 | Stop reason: HOSPADM

## 2023-01-20 RX ORDER — FAMOTIDINE 20 MG/1
20 TABLET, FILM COATED ORAL ONCE
Status: COMPLETED | OUTPATIENT
Start: 2023-01-20 | End: 2023-01-20

## 2023-01-20 RX ORDER — SODIUM CHLORIDE 9 MG/ML
40 INJECTION, SOLUTION INTRAVENOUS AS NEEDED
Status: DISCONTINUED | OUTPATIENT
Start: 2023-01-20 | End: 2023-01-20 | Stop reason: HOSPADM

## 2023-01-20 RX ORDER — SODIUM CHLORIDE, SODIUM LACTATE, POTASSIUM CHLORIDE, CALCIUM CHLORIDE 600; 310; 30; 20 MG/100ML; MG/100ML; MG/100ML; MG/100ML
9 INJECTION, SOLUTION INTRAVENOUS CONTINUOUS
Status: DISCONTINUED | OUTPATIENT
Start: 2023-01-20 | End: 2023-01-20 | Stop reason: HOSPADM

## 2023-01-20 RX ORDER — DEXAMETHASONE SODIUM PHOSPHATE 4 MG/ML
INJECTION, SOLUTION INTRA-ARTICULAR; INTRALESIONAL; INTRAMUSCULAR; INTRAVENOUS; SOFT TISSUE AS NEEDED
Status: DISCONTINUED | OUTPATIENT
Start: 2023-01-20 | End: 2023-01-20 | Stop reason: SURG

## 2023-01-20 RX ORDER — SODIUM CHLORIDE 0.9 % (FLUSH) 0.9 %
10 SYRINGE (ML) INJECTION AS NEEDED
Status: DISCONTINUED | OUTPATIENT
Start: 2023-01-20 | End: 2023-01-20 | Stop reason: HOSPADM

## 2023-01-20 RX ORDER — FENTANYL CITRATE 50 UG/ML
INJECTION, SOLUTION INTRAMUSCULAR; INTRAVENOUS
Status: DISCONTINUED
Start: 2023-01-20 | End: 2023-01-20 | Stop reason: HOSPADM

## 2023-01-20 RX ORDER — ESMOLOL HYDROCHLORIDE 10 MG/ML
INJECTION INTRAVENOUS AS NEEDED
Status: DISCONTINUED | OUTPATIENT
Start: 2023-01-20 | End: 2023-01-20 | Stop reason: SURG

## 2023-01-20 RX ORDER — CEFAZOLIN SODIUM 2 G/100ML
2 INJECTION, SOLUTION INTRAVENOUS ONCE
Status: COMPLETED | OUTPATIENT
Start: 2023-01-20 | End: 2023-01-20

## 2023-01-20 RX ORDER — PROPOFOL 10 MG/ML
VIAL (ML) INTRAVENOUS AS NEEDED
Status: DISCONTINUED | OUTPATIENT
Start: 2023-01-20 | End: 2023-01-20 | Stop reason: SURG

## 2023-01-20 RX ORDER — LIDOCAINE HYDROCHLORIDE 10 MG/ML
0.5 INJECTION, SOLUTION EPIDURAL; INFILTRATION; INTRACAUDAL; PERINEURAL ONCE AS NEEDED
Status: COMPLETED | OUTPATIENT
Start: 2023-01-20 | End: 2023-01-20

## 2023-01-20 RX ORDER — SODIUM CHLORIDE 0.9 % (FLUSH) 0.9 %
10 SYRINGE (ML) INJECTION EVERY 12 HOURS SCHEDULED
Status: DISCONTINUED | OUTPATIENT
Start: 2023-01-20 | End: 2023-01-20 | Stop reason: HOSPADM

## 2023-01-20 RX ORDER — HYDROMORPHONE HYDROCHLORIDE 1 MG/ML
0.5 INJECTION, SOLUTION INTRAMUSCULAR; INTRAVENOUS; SUBCUTANEOUS
Status: DISCONTINUED | OUTPATIENT
Start: 2023-01-20 | End: 2023-01-20 | Stop reason: HOSPADM

## 2023-01-20 RX ORDER — LIDOCAINE HYDROCHLORIDE 10 MG/ML
INJECTION, SOLUTION EPIDURAL; INFILTRATION; INTRACAUDAL; PERINEURAL AS NEEDED
Status: DISCONTINUED | OUTPATIENT
Start: 2023-01-20 | End: 2023-01-20 | Stop reason: SURG

## 2023-01-20 RX ORDER — ROCURONIUM BROMIDE 10 MG/ML
INJECTION, SOLUTION INTRAVENOUS AS NEEDED
Status: DISCONTINUED | OUTPATIENT
Start: 2023-01-20 | End: 2023-01-20 | Stop reason: SURG

## 2023-01-20 RX ORDER — ONDANSETRON 2 MG/ML
4 INJECTION INTRAMUSCULAR; INTRAVENOUS ONCE AS NEEDED
Status: DISCONTINUED | OUTPATIENT
Start: 2023-01-20 | End: 2023-01-20 | Stop reason: HOSPADM

## 2023-01-20 RX ORDER — IPRATROPIUM BROMIDE AND ALBUTEROL SULFATE 2.5; .5 MG/3ML; MG/3ML
3 SOLUTION RESPIRATORY (INHALATION) ONCE AS NEEDED
Status: DISCONTINUED | OUTPATIENT
Start: 2023-01-20 | End: 2023-01-20 | Stop reason: HOSPADM

## 2023-01-20 RX ORDER — OXYCODONE HYDROCHLORIDE 5 MG/1
5 TABLET ORAL ONCE
Status: COMPLETED | OUTPATIENT
Start: 2023-01-20 | End: 2023-01-20

## 2023-01-20 RX ORDER — FENTANYL CITRATE 50 UG/ML
50 INJECTION, SOLUTION INTRAMUSCULAR; INTRAVENOUS
Status: DISCONTINUED | OUTPATIENT
Start: 2023-01-20 | End: 2023-01-20 | Stop reason: HOSPADM

## 2023-01-20 RX ORDER — ONDANSETRON 2 MG/ML
INJECTION INTRAMUSCULAR; INTRAVENOUS AS NEEDED
Status: DISCONTINUED | OUTPATIENT
Start: 2023-01-20 | End: 2023-01-20 | Stop reason: SDUPTHER

## 2023-01-20 RX ORDER — DEXAMETHASONE SODIUM PHOSPHATE 10 MG/ML
INJECTION, SOLUTION INTRAMUSCULAR; INTRAVENOUS
Status: COMPLETED | OUTPATIENT
Start: 2023-01-20 | End: 2023-01-20

## 2023-01-20 RX ADMIN — HYDROMORPHONE HYDROCHLORIDE 0.5 MG: 1 INJECTION, SOLUTION INTRAMUSCULAR; INTRAVENOUS; SUBCUTANEOUS at 16:40

## 2023-01-20 RX ADMIN — CEFAZOLIN SODIUM 2 G: 2 INJECTION, SOLUTION INTRAVENOUS at 12:55

## 2023-01-20 RX ADMIN — ROCURONIUM BROMIDE 20 MG: 50 INJECTION INTRAVENOUS at 13:55

## 2023-01-20 RX ADMIN — FENTANYL CITRATE 50 MCG: 50 INJECTION, SOLUTION INTRAMUSCULAR; INTRAVENOUS at 16:18

## 2023-01-20 RX ADMIN — HYDROMORPHONE HYDROCHLORIDE 0.5 MG: 1 INJECTION, SOLUTION INTRAMUSCULAR; INTRAVENOUS; SUBCUTANEOUS at 15:50

## 2023-01-20 RX ADMIN — FENTANYL CITRATE 50 MCG: 50 INJECTION, SOLUTION INTRAMUSCULAR; INTRAVENOUS at 15:53

## 2023-01-20 RX ADMIN — DEXAMETHASONE SODIUM PHOSPHATE 4 MG: 10 INJECTION, SOLUTION INTRAMUSCULAR; INTRAVENOUS at 12:52

## 2023-01-20 RX ADMIN — ROCURONIUM BROMIDE 50 MG: 50 INJECTION INTRAVENOUS at 12:49

## 2023-01-20 RX ADMIN — PROPOFOL 200 MG: 10 INJECTION, EMULSION INTRAVENOUS at 12:49

## 2023-01-20 RX ADMIN — FENTANYL CITRATE 100 MCG: 50 INJECTION, SOLUTION INTRAMUSCULAR; INTRAVENOUS at 15:09

## 2023-01-20 RX ADMIN — FENTANYL CITRATE 100 MCG: 50 INJECTION, SOLUTION INTRAMUSCULAR; INTRAVENOUS at 12:59

## 2023-01-20 RX ADMIN — BUPIVACAINE HYDROCHLORIDE 40 ML: 2.5 INJECTION, SOLUTION EPIDURAL; INFILTRATION; INTRACAUDAL at 12:52

## 2023-01-20 RX ADMIN — LIDOCAINE HYDROCHLORIDE 0.5 ML: 10 INJECTION, SOLUTION EPIDURAL; INFILTRATION; INTRACAUDAL; PERINEURAL at 10:23

## 2023-01-20 RX ADMIN — LIDOCAINE HYDROCHLORIDE 50 MG: 10 INJECTION, SOLUTION EPIDURAL; INFILTRATION; INTRACAUDAL; PERINEURAL at 12:49

## 2023-01-20 RX ADMIN — SODIUM CHLORIDE, POTASSIUM CHLORIDE, SODIUM LACTATE AND CALCIUM CHLORIDE 9 ML/HR: 600; 310; 30; 20 INJECTION, SOLUTION INTRAVENOUS at 10:23

## 2023-01-20 RX ADMIN — DEXAMETHASONE SODIUM PHOSPHATE 8 MG: 4 INJECTION, SOLUTION INTRAMUSCULAR; INTRAVENOUS at 12:55

## 2023-01-20 RX ADMIN — HYDROMORPHONE HYDROCHLORIDE 0.5 MG: 1 INJECTION, SOLUTION INTRAMUSCULAR; INTRAVENOUS; SUBCUTANEOUS at 15:57

## 2023-01-20 RX ADMIN — SUGAMMADEX 200 MG: 100 INJECTION, SOLUTION INTRAVENOUS at 15:36

## 2023-01-20 RX ADMIN — PROPOFOL 25 MCG/KG/MIN: 10 INJECTION, EMULSION INTRAVENOUS at 12:55

## 2023-01-20 RX ADMIN — OXYCODONE HYDROCHLORIDE 5 MG: 5 TABLET ORAL at 16:23

## 2023-01-20 RX ADMIN — ESMOLOL HYDROCHLORIDE 50 MG: 10 INJECTION, SOLUTION INTRAVENOUS at 12:49

## 2023-01-20 RX ADMIN — ONDANSETRON 4 MG: 2 INJECTION INTRAMUSCULAR; INTRAVENOUS at 15:09

## 2023-01-20 RX ADMIN — FAMOTIDINE 20 MG: 20 TABLET, FILM COATED ORAL at 10:23

## 2023-01-20 NOTE — OP NOTE
HIP ARTHROSCOPY WITH LABRAL REPAIR  Procedure Report    Patient Name:  Julia Monge  YOB: 1990    Date of Surgery:  1/20/2023     Indications: 42-year-old with persistent pain of the right hip this been refractory to conservative measures.  Treatment options were discussed at length including operative versus nonoperative treatment.  Risks and benefits of surgery were discussed including but not limited to bleeding, infection, injury to surrounding structures such as blood vessels tendons and nerves. We discussed the possibility of surgical procedure failure, nonhealing or retear of the labrum, persistent pain, loss of motion, persistent stiffness, persistent weakness, and the need for a revision surgery. In addition, we discussed the risk of heart attack, stroke, or death.  She understands these as well as alternative forms of treatment and does elect to proceed.    Pre-op Diagnosis:      Right hip labral tear, femoral acetabular impingement    Post-op Diagnosis:      Right hip labral tear, femoral acetabular impingement    Procedure/CPT® Codes:      Procedure(s):  HIP ARTHOSCOPY WITH LABRAL REPAIR, FEMOROPLASTY,  ACETBULOPLASTY AND CAPSULAR CLOSURE - RIGHT    Staff:  Surgeon(s):  Ashkan Prabhakar MD    Circulator: Keyana Flynn RN; Larissa Beard RN; Zohra Menendez RN  Radiology Technologist: Giovanny Carvajal RT  Scrub Person: Dony Savage Christopher  Nursing Assistant: Umair Henderson PCT  Assistant: Cory Castillo PA-C     Assistant: Cory Castillo PA-C  Indication for surgical assistant:  Surgical assistant was indicated for assistance with patient positioning as well as critical portions of the procedure including holding the camera, placement of implants/hardware, and closure of wounds, placement of brace.    Anesthesia: General    Estimated Blood Loss: minimal    Implants:    Implant Name Type Inv. Item Serial No.  Lot No. LRB No. Used  Action   SUT/ANCH HIP NANOTACK W/FLX INSRT 1.4MM - [id number] Implant SUT/ANCH HIP NANOTACK W/FLX INSRT 1.4MM  [employer] [id number] Right 1 Implanted   SUT/ANCH HIP NANOTACK W/FLX INSRT 1.4MM - [id number] Implant SUT/ANCH HIP NANOTACK W/FLX INSRT 1.4MM  [employer] PASTOR [id number] Right 1 Implanted   SUT/ANCH HIP NANOTACK W/FLX INSRT 1.4MM - [id number] Implant SUT/ANCH HIP NANOTACK W/FLX INSRT 1.4MM  [employer] [id number] Right 1 Implanted   SUT/ANCH HIP NANOTACK W/FLX INSRT 1.4MM - [id number] Implant SUT/ANCH HIP NANOTACK W/FLX INSRT 1.4MM  [employer] PA[employer]OR [id number] Right 1 Implanted   SUT/ANCH HIP NANOTACK W/FLX INSRT 1.4MM - [id number] Implant SUT/ANCH HIP NANOTACK W/FLX INSRT 1.4MM  [employer] [id number] Right 1 Implanted       Specimen:                None    Complications: None apparent    Description of Procedure:     Patient was identified in the preoperative holding area and the right hip was marked.  Patient was transported to the OR and while on the operative stretcher general anesthesia was induced.  Patient was then transferred to the hip distraction bed and positioned with all bony prominences padded. The right hip was prepped and draped in the usual sterile fashion.  Preoperative time out was performed indicating correct patient, correct side, correct procedure, and that preoperative antibiotic had been given.  Next traction was pulled on the hip and x-rays were used to confirm hip distraction.  The first anterior lateral portal was created using a long spinal needle under C-arm guidance.  The scope was placed in the hip joint and under direct visualization a direct anterior portal was created.  A capsulotomy was created between the 2 portal sites.  Evaluation of the central compartment showed no evidence of cartilage injury centrally, no loose bodies, no tear of the ligamentum teres.  Evaluation of the labrum showed tearing of the labrum from the 230 o'clock to 10 o'clock position on the labral face.  There  was  associated chondral wear at the labral chondral junction throughout this area and extending further posteriorly to approximately the 9:00 or 8:30 position.  There was no full-thickness chondral defects noted.  Any loose flaps were gently debrided to stable rim.  Evaluation of the acetabulum did show no significant overhang of acetabular bone.  A bur was used to prepare the acetabular rim for labral fixation.  This was done through the entire area of the labral tear.  Once complete I did decide to proceed with a repair for the labrum.   Starting at the anterior position on the labral face the first anchor was thrown.  The suture was shuttled through the labral tissue and a knot was tied on to the acetabular side securing the labrum back up onto the acetabulum.  In a similar fashion anchors were thrown proceeding posteriorly reducing the labrum.  A total of 5 anchors were thrown.  Probing of the labrum after repair showed excellent stability of the repair.  Once this was complete traction was released off the hip.  There was noted to be excellent recreation of the suction seal of the labrum.  Next the femoral head neck junction was evaluated.  There was noted to be a cam lesion at the femoral head neck junction over the anterior lateral aspect of the femoral neck.  Under fluoroscopic guidance using the hip check system the femoral Cam lesion was debrided using an arthroscopic bur.  Once this was complete final resection was confirmed using C arm.  There was  noted to be appropriate alpha angle through all x-rays views.    Next, I did proceed with a capsular closure utilizing 4 free #2 force wire nonabsorbable sutures.  Excess fluid was drained from the hip and the portals were closed using 3-0 nylon.  The leg was then washed and dressed.  The patient was placed into a hip brace to protect and stabilize the repair.  The patient was then awoken and taken to the recovery room in stable condition.    Disposition:    Patient partial weightbearing to the right lower extremity with an assist device at all times.  Patient will start physical therapy as soon as possible per the protocol provided.  I will plan on seeing her back in 10-14 days for first postoperative check and removal of sutures at that point.    Ashkan Prabhakar MD     Date: 1/20/2023  Time: 15:21 EST

## 2023-01-20 NOTE — ANESTHESIA POSTPROCEDURE EVALUATION
Patient: Julia Lovinger    Procedure Summary     Date: 01/20/23 Room / Location:  [facility]  [facility]  [facility]    Anesthesia Start: 1246 Anesthesia Stop: 1545    Procedure: HIP ARTHOSCOPY WITH LABRAL REPAIR, FEMOROPLASTY,  ACETBULOPLASTY AND CAPSULAR CLOSURE - RIGHT (Right: Hip) Diagnosis:     Surgeons: Ashkan Prabhakar MD Provider: Robert Cason MD    Anesthesia Type: general with block ASA Status: 1          Anesthesia Type: general with block    Vitals  Vitals Value Taken Time   /62 01/20/23 1541   Temp 97.9 °F (36.6 °C) 01/20/23 1540   Pulse 131 01/20/23 1544   Resp 16 01/20/23 1540   SpO2 97 % 01/20/23 1544   Vitals shown include unvalidated device data.        Post Anesthesia Care and Evaluation    Patient location during evaluation: PACU  Patient participation: complete - patient participated  Level of consciousness: awake and alert  Pain management: adequate    Airway patency: patent  Anesthetic complications: No anesthetic complications  PONV Status: none  Cardiovascular status: hemodynamically stable and acceptable  Respiratory status: nonlabored ventilation, acceptable and nasal cannula  Hydration status: acceptable

## 2023-01-20 NOTE — ANESTHESIA PROCEDURE NOTES
[facility]      Patient reassessed immediately prior to procedure    Reason for block: at surgeon's request and post-op pain management  Performed by  CRNA/CAA: Soy August, MAGNUSSRNA: Giovanny James SRNA  Preanesthetic Checklist  Completed: patient identified, IV checked, site marked, risks and benefits discussed, surgical consent, monitors and equipment checked, pre-op evaluation and timeout performed  Prep:  Pt Position: supine  Sterile barriers:cap, gloves, mask and washed/disinfected hands  Prep: ChloraPrep  Patient monitoring: blood pressure monitoring, continuous pulse oximetry and EKG  Procedure    Sedation: no  Performed under: general  Guidance:ultrasound guided    ULTRASOUND INTERPRETATION.  Using ultrasound guidance a 20 G gauge needle was placed in close proximity to the nerve, at which point, under ultrasound guidance anesthetic was injected in the area of the nerve and spread of the anesthesia was seen on ultrasound in close proximity thereto.  There were no abnormalities seen on ultrasound; a digital image was taken; and the patient tolerated the procedure with no complications. Images:still images obtained, printed/placed on chart    Laterality:right  Block Type:PENG (PENG)  Injection Technique:single-shot  Needle Type:echogenic and short-bevel  Needle Gauge:20 G  Resistance on Injection: none    Medications Used: dexamethasone sodium phosphate injection - Injection   4 mg - 1/20/2023 12:52:00 PM  bupivacaine PF (MARCAINE) 0.25 % injection - Injection   40 mL - 1/20/2023 12:52:00 PM      Post Assessment  Injection Assessment: negative aspiration for heme, no paresthesia on injection and incremental injection  Patient Tolerance:comfortable throughout block  Complications:no  Additional Notes  A curvilinear ultrasound transducer, with a sterile cover, was placed over the femoral crease in a transverse oblique orientation to image the head of the femur. The transducer was moved cranially until the  "Anterior Inferior Iliac Spine (AIIS), and Iliopubic Eminence (IPE) were visualized (pelvic rim). The insertion site was prepped in sterile fashion and then localized with 2-5 ml of 1% Lidocaine. Using ultrasound-guidance, a 20-gauge B-Zayas 4\" Ultraplex 360 non-stimulating echogenic needled was advanced in-plane, lateral to medial. The needle was tracked midway between AIIS and the IPE, thru the Psoas muscle and below the Iliopsoas Tendon.  The tendon was hydro-dissected upward with NS and then local anesthetic was injected in 3-5 ml aliquots. The femoral artery and nerve were visualized superficial to the Iliopsoas Muscle; no spread of local anesthetic was noted toward these structures. Aspiration every 5 ml to prevent intravascular injection. Injection was completed with negative aspiration of blood and negative intravascular injection. Injection pressures were normal with minimal resistance.           "

## 2023-01-20 NOTE — INTERVAL H&P NOTE
"[facility] Pre-op    Full history and physical note from office is attached.    /81 (BP Location: Right arm, Patient Position: Lying)   Pulse 95   Temp 98.3 °F (36.8 °C) (Tympanic)   Resp 16   Ht 170.2 cm (67\")   Wt 84.4 kg (186 lb)   SpO2 97%   BMI 29.13 kg/m²     Immunizations:  Influenza:  2022  Pneumococcal:  No  Tetanus:  UTD  Covid x2: 2021      LAB Results:  Lab Results   Component Value Date    WBC 4.74 07/28/2022    HGB 14.3 07/28/2022    HCT 41.3 07/28/2022    MCV 91.0 07/28/2022     07/28/2022    NEUTROABS 3.07 07/28/2022       Cancer Staging (if applicable)  Cancer Patient: __ yes __no __unknown__N/A; If yes, clinical stage T:__ N:__M:__, stage group or __N/A      Impression: Right hip pain      Plan: HIP ARTHOSCOPY WITH LABRAL REPAIR  VS. RECONSTRUCTION, FEMOROPLASTY ACETBULOPLASTY AND CAPSULAR CLOSURE - RIGHT      Thuy Lane, APRN   1/20/2023   10:41 EST   "

## 2023-01-20 NOTE — ANESTHESIA PREPROCEDURE EVALUATION
Anesthesia Evaluation     Patient summary reviewed and Nursing notes reviewed   no history of anesthetic complications:  NPO Solid Status: > 8 hours  NPO Liquid Status: > 2 hours           Airway   Mallampati: II  TM distance: >3 FB  Neck ROM: full  No difficulty expected  Dental - normal exam     Pulmonary - negative pulmonary ROS and normal exam    breath sounds clear to auscultation  Cardiovascular - negative cardio ROS and normal exam    Rhythm: regular  Rate: normal        Neuro/Psych- negative ROS  GI/Hepatic/Renal/Endo - negative ROS     Musculoskeletal (-) negative ROS    Abdominal    Substance History      OB/GYN          Other - negative ROS                       Anesthesia Plan    ASA 1     general with block     (Right PENG block post-induction for post-operative analgesia per request of Dr. Prabhakar)  intravenous induction     Anesthetic plan, risks, benefits, and alternatives have been provided, discussed and informed consent has been obtained with: patient.    Plan discussed with CRNA.        CODE STATUS:

## 2023-01-20 NOTE — ANESTHESIA PROCEDURE NOTES
Airway  Urgency: elective    Date/Time: 1/20/2023 12:51 PM  Airway not difficult    General Information and Staff    Patient location during procedure: OR  CRNA/CAA: Igor Molina CRNA    Indications and Patient Condition  Indications for airway management: airway protection    Preoxygenated: yes  MILS not maintained throughout  Mask difficulty assessment: 1 - vent by mask    Final Airway Details  Final airway type: endotracheal airway      Successful airway: ETT  Cuffed: yes   Successful intubation technique: direct laryngoscopy  Facilitating devices/methods: intubating stylet  Endotracheal tube insertion site: oral  Blade: Bermeo  Blade size: 2  ETT size (mm): 7.0  Cormack-Lehane Classification: grade I - full view of glottis  Placement verified by: chest auscultation and capnometry   Cuff volume (mL): 6  Measured from: lips  ETT/EBT  to lips (cm): 20  Number of attempts at approach: 1  Assessment: lips, teeth, and gum same as pre-op and atraumatic intubation    Additional Comments  Negative epigastric sounds, Breath sound equal bilaterally with symmetric chest rise and fall

## (undated) DEVICE — DECANTER BAG 9": Brand: MEDLINE INDUSTRIES, INC.

## (undated) DEVICE — TP MICROFM 4IN LF

## (undated) DEVICE — SAMURAI BLADE FULL RADIUS: Brand: SAMURAI

## (undated) DEVICE — SUT XBRAID/S NMBR2 MO/6 TPR 1/2CIR 26MM 39IN BLU/BLK

## (undated) DEVICE — PORTAL ENTRY KIT

## (undated) DEVICE — NANOPASS REACH CRESCENT: Brand: NANOPASS

## (undated) DEVICE — FLOWPORT II CANNULA WITH OBTURATOR STRYKER 165MM: Brand: FLOWPORT

## (undated) DEVICE — TP SILK DURAPORE 3IN

## (undated) DEVICE — XL TOMCAT, HIP CUTTER. ARTHROSCOPIC SHAVER BLADE. FOR USE WITH: REF 0375-701-500, 0375-704-500, 0375-708-500. DO NOT USE IF PACKAGE IS DAMAGED, KEEP DRY, KEEP AWAY FROM SUNLIGHT: Brand: FORMULA

## (undated) DEVICE — 50-S SWEEP + XL, SUCTION PROBE, NON-BENDABLE, XL/HIP LENGTH: Brand: SERFAS ENERGY

## (undated) DEVICE — DRSNG PAD ABD 8X10IN STRL

## (undated) DEVICE — GLV SURG SENSICARE PI MIC PF SZ7.5 LF STRL

## (undated) DEVICE — TUBING, SUCTION, 1/4" X 10', STRAIGHT: Brand: MEDLINE

## (undated) DEVICE — INTENDED FOR TISSUE SEPARATION, AND OTHER PROCEDURES THAT REQUIRE A SHARP SURGICAL BLADE TO PUNCTURE OR CUT.: Brand: BARD-PARKER ® STAINLESS STEEL BLADES

## (undated) DEVICE — PAD ARMBRD SURG CONVOL 7.5X20X2IN

## (undated) DEVICE — APPL CHLORAPREP TINTED 26ML TEAL

## (undated) DEVICE — GLV SURG SENSICARE PI MIC PF SZ8 LF STRL

## (undated) DEVICE — NANOTACK FLEX DRILL BIT: Brand: NANOTACK FLEX

## (undated) DEVICE — COVER,MAYO STAND,STERILE: Brand: MEDLINE

## (undated) DEVICE — TB CASSET ARTHSCP CROSSFLOW INFLOW LF

## (undated) DEVICE — SUT ETHLN 3/0 FS1 30IN 669H

## (undated) DEVICE — PK MINOR SPLT 10

## (undated) DEVICE — XL, ARTHROSCOPIC SHAVER BLADES, DIAMOND ROUND BUR.  DO NOT RESTERILIZE, DO NOT USE IF PACKAGE IS DAMAGED, KEEP DRY, KEEP AWAY FROM SUNLIGHT: Brand: CROSSBLADE

## (undated) DEVICE — SLINGSHOT 70 UP: Brand: SLINGSHOT

## (undated) DEVICE — C-ARM DRAPE: Brand: DEROYAL

## (undated) DEVICE — SPNG GZ WOVN 4X4IN 12PLY 10/BX STRL

## (undated) DEVICE — BLANKT WARM UPPR/BDY ARM/OUT 57X196CM

## (undated) DEVICE — TRANSPORT CANNULA 8MM LENGTH 7, 8, 9: Brand: TRANSPORT

## (undated) DEVICE — DRSNG GZ PETROLTM XEROFORM CURAD 1X8IN STRL

## (undated) DEVICE — Device

## (undated) DEVICE — 6617 IOBAN II PATIENT ISOLATION DRAPE 5/BX,4BX/CS: Brand: STERI-DRAPE™ IOBAN™ 2

## (undated) DEVICE — BOWL UTIL STRL 32OZ